# Patient Record
Sex: FEMALE | Race: WHITE | NOT HISPANIC OR LATINO | Employment: UNEMPLOYED | URBAN - METROPOLITAN AREA
[De-identification: names, ages, dates, MRNs, and addresses within clinical notes are randomized per-mention and may not be internally consistent; named-entity substitution may affect disease eponyms.]

---

## 2021-01-27 RX ORDER — PRAVASTATIN SODIUM 20 MG
20 TABLET ORAL DAILY
COMMUNITY

## 2021-01-27 RX ORDER — PIOGLITAZONEHYDROCHLORIDE 30 MG/1
30 TABLET ORAL DAILY
COMMUNITY

## 2021-01-27 RX ORDER — DOXYCYCLINE HYCLATE 50 MG/1
324 CAPSULE, GELATIN COATED ORAL
COMMUNITY
End: 2021-02-05 | Stop reason: HOSPADM

## 2021-01-27 RX ORDER — PAROXETINE HYDROCHLORIDE 40 MG/1
40 TABLET, FILM COATED ORAL EVERY MORNING
COMMUNITY

## 2021-01-27 RX ORDER — SODIUM BICARBONATE 325 MG/1
325 TABLET ORAL 3 TIMES DAILY
COMMUNITY

## 2021-01-27 RX ORDER — HYDRALAZINE HYDROCHLORIDE 25 MG/1
25 TABLET, FILM COATED ORAL 3 TIMES DAILY
COMMUNITY

## 2021-01-27 RX ORDER — VENLAFAXINE 37.5 MG/1
37.5 TABLET ORAL
COMMUNITY

## 2021-01-27 RX ORDER — ASPIRIN 81 MG/1
81 TABLET ORAL DAILY
COMMUNITY
End: 2021-02-05 | Stop reason: HOSPADM

## 2021-01-27 RX ORDER — GLIPIZIDE 5 MG/1
5 TABLET ORAL
COMMUNITY

## 2021-01-27 RX ORDER — HYDROCHLOROTHIAZIDE 25 MG/1
25 TABLET ORAL DAILY
COMMUNITY

## 2021-01-27 RX ORDER — VERAPAMIL HYDROCHLORIDE 240 MG/1
240 TABLET, FILM COATED, EXTENDED RELEASE ORAL NIGHTLY
COMMUNITY

## 2021-01-27 RX ORDER — CALCITRIOL 0.25 UG/1
0.25 CAPSULE, LIQUID FILLED ORAL DAILY
COMMUNITY

## 2021-01-28 ENCOUNTER — HOSPITAL ENCOUNTER (OUTPATIENT)
Dept: CARDIOLOGY | Facility: HOSPITAL | Age: 61
Discharge: HOME | End: 2021-01-28

## 2021-01-28 ENCOUNTER — HOSPITAL ENCOUNTER (OUTPATIENT)
Dept: GENERAL RADIOLOGY | Facility: HOSPITAL | Age: 61
Discharge: HOME | End: 2021-01-28

## 2021-01-28 ENCOUNTER — LAB (OUTPATIENT)
Dept: LAB | Facility: HOSPITAL | Age: 61
End: 2021-01-28
Attending: ORTHOPAEDIC SURGERY

## 2021-01-28 LAB
ABO GROUP BLD: NORMAL
ANION GAP SERPL CALCULATED.3IONS-SCNC: 11 MMOL/L (ref 5–15)
APTT PPP: 26.5 SECONDS (ref 24–31)
BILIRUB UR QL STRIP: NEGATIVE
BLD GP AB SCN SERPL QL: NEGATIVE
BUN SERPL-MCNC: 34 MG/DL (ref 8–23)
BUN/CREAT SERPL: 20.5 (ref 7–25)
CALCIUM SPEC-SCNC: 8.9 MG/DL (ref 8.6–10.5)
CHLORIDE SERPL-SCNC: 99 MMOL/L (ref 98–107)
CLARITY UR: CLEAR
CO2 SERPL-SCNC: 28 MMOL/L (ref 22–29)
COLOR UR: YELLOW
CREAT SERPL-MCNC: 1.66 MG/DL (ref 0.57–1)
DEPRECATED RDW RBC AUTO: 41.4 FL (ref 37–54)
ERYTHROCYTE [DISTWIDTH] IN BLOOD BY AUTOMATED COUNT: 14.6 % (ref 12.3–15.4)
GFR SERPL CREATININE-BSD FRML MDRD: 32 ML/MIN/1.73
GLUCOSE SERPL-MCNC: 56 MG/DL (ref 65–99)
GLUCOSE UR STRIP-MCNC: NEGATIVE MG/DL
HCT VFR BLD AUTO: 32.5 % (ref 34–46.6)
HGB BLD-MCNC: 10.5 G/DL (ref 12–15.9)
HGB UR QL STRIP.AUTO: NEGATIVE
INR PPP: 1.09 (ref 0.93–1.1)
KETONES UR QL STRIP: NEGATIVE
LEUKOCYTE ESTERASE UR QL STRIP.AUTO: NEGATIVE
MCH RBC QN AUTO: 25.4 PG (ref 26.6–33)
MCHC RBC AUTO-ENTMCNC: 32.3 G/DL (ref 31.5–35.7)
MCV RBC AUTO: 78.7 FL (ref 79–97)
MRSA DNA SPEC QL NAA+PROBE: NORMAL
NITRITE UR QL STRIP: NEGATIVE
PH UR STRIP.AUTO: 7 [PH] (ref 5–8)
PLATELET # BLD AUTO: 342 10*3/MM3 (ref 140–450)
PMV BLD AUTO: 10.5 FL (ref 6–12)
POTASSIUM SERPL-SCNC: 3.8 MMOL/L (ref 3.5–5.2)
PROT UR QL STRIP: ABNORMAL
PROTHROMBIN TIME: 11.9 SECONDS (ref 9.6–11.7)
RBC # BLD AUTO: 4.13 10*6/MM3 (ref 3.77–5.28)
RH BLD: NEGATIVE
SODIUM SERPL-SCNC: 138 MMOL/L (ref 136–145)
SP GR UR STRIP: 1.02 (ref 1–1.03)
T&S EXPIRATION DATE: NORMAL
UROBILINOGEN UR QL STRIP: ABNORMAL
WBC # BLD AUTO: 7.49 10*3/MM3 (ref 3.4–10.8)

## 2021-01-28 PROCEDURE — 71046 X-RAY EXAM CHEST 2 VIEWS: CPT

## 2021-01-28 PROCEDURE — 93005 ELECTROCARDIOGRAM TRACING: CPT | Performed by: ORTHOPAEDIC SURGERY

## 2021-01-28 PROCEDURE — 93010 ELECTROCARDIOGRAM REPORT: CPT | Performed by: INTERNAL MEDICINE

## 2021-01-28 PROCEDURE — 85027 COMPLETE CBC AUTOMATED: CPT

## 2021-01-28 PROCEDURE — 81003 URINALYSIS AUTO W/O SCOPE: CPT

## 2021-01-28 PROCEDURE — 86850 RBC ANTIBODY SCREEN: CPT

## 2021-01-28 PROCEDURE — 80048 BASIC METABOLIC PNL TOTAL CA: CPT

## 2021-01-28 PROCEDURE — 86901 BLOOD TYPING SEROLOGIC RH(D): CPT

## 2021-01-28 PROCEDURE — 86900 BLOOD TYPING SEROLOGIC ABO: CPT

## 2021-01-28 PROCEDURE — 87641 MR-STAPH DNA AMP PROBE: CPT

## 2021-01-28 PROCEDURE — 85730 THROMBOPLASTIN TIME PARTIAL: CPT

## 2021-01-28 PROCEDURE — 85610 PROTHROMBIN TIME: CPT

## 2021-01-29 LAB — QT INTERVAL: 461 MS

## 2021-02-01 ASSESSMENT — HOOS JR
HOOS JR SCORE: 11
HOOS JR SCORE: 55.985

## 2021-02-02 ENCOUNTER — LAB (OUTPATIENT)
Dept: LAB | Facility: HOSPITAL | Age: 61
End: 2021-02-02

## 2021-02-02 LAB — SARS-COV-2 ORF1AB RESP QL NAA+PROBE: NOT DETECTED

## 2021-02-02 PROCEDURE — U0004 COV-19 TEST NON-CDC HGH THRU: HCPCS

## 2021-02-02 PROCEDURE — C9803 HOPD COVID-19 SPEC COLLECT: HCPCS

## 2021-02-03 ENCOUNTER — ANESTHESIA EVENT (OUTPATIENT)
Dept: PERIOP | Facility: HOSPITAL | Age: 61
End: 2021-02-03

## 2021-02-04 ENCOUNTER — HOSPITAL ENCOUNTER (INPATIENT)
Facility: HOSPITAL | Age: 61
LOS: 1 days | Discharge: HOME | End: 2021-02-05
Attending: ORTHOPAEDIC SURGERY | Admitting: ORTHOPAEDIC SURGERY

## 2021-02-04 ENCOUNTER — ANESTHESIA (OUTPATIENT)
Dept: PERIOP | Facility: HOSPITAL | Age: 61
End: 2021-02-04

## 2021-02-04 ENCOUNTER — APPOINTMENT (OUTPATIENT)
Dept: GENERAL RADIOLOGY | Facility: HOSPITAL | Age: 61
End: 2021-02-04

## 2021-02-04 DIAGNOSIS — M16.11 ARTHRITIS OF RIGHT HIP: Primary | ICD-10-CM

## 2021-02-04 PROBLEM — N18.4 CHRONIC KIDNEY DISEASE, STAGE 4 (SEVERE) (HCC): Chronic | Status: ACTIVE | Noted: 2018-11-07

## 2021-02-04 PROBLEM — N18.4 CHRONIC KIDNEY DISEASE, STAGE 4 (SEVERE) (HCC): Status: ACTIVE | Noted: 2018-11-07

## 2021-02-04 PROBLEM — E78.2 MIXED HYPERLIPIDEMIA: Chronic | Status: ACTIVE | Noted: 2020-10-16

## 2021-02-04 PROBLEM — E11.9 TYPE 2 DIABETES MELLITUS WITHOUT COMPLICATION (HCC): Chronic | Status: ACTIVE | Noted: 2020-09-16

## 2021-02-04 PROBLEM — E66.9 OBESITY: Chronic | Status: ACTIVE | Noted: 2018-11-08

## 2021-02-04 PROBLEM — E11.42 DIABETIC PERIPHERAL NEUROPATHY: Chronic | Status: ACTIVE | Noted: 2020-10-16

## 2021-02-04 PROBLEM — M19.90 OSTEOARTHRITIS: Status: ACTIVE | Noted: 2020-10-16

## 2021-02-04 PROBLEM — E11.42 DIABETIC PERIPHERAL NEUROPATHY (HCC): Status: ACTIVE | Noted: 2020-10-16

## 2021-02-04 PROBLEM — E55.9 VITAMIN D DEFICIENCY: Status: ACTIVE | Noted: 2020-10-16

## 2021-02-04 PROBLEM — I10 ESSENTIAL HYPERTENSION: Status: ACTIVE | Noted: 2018-05-10

## 2021-02-04 PROBLEM — E78.2 MIXED HYPERLIPIDEMIA: Status: ACTIVE | Noted: 2020-10-16

## 2021-02-04 PROBLEM — E66.9 OBESITY: Status: ACTIVE | Noted: 2018-11-08

## 2021-02-04 PROBLEM — I10 ESSENTIAL HYPERTENSION: Chronic | Status: ACTIVE | Noted: 2018-05-10

## 2021-02-04 PROBLEM — E11.9 TYPE 2 DIABETES MELLITUS WITHOUT COMPLICATION (HCC): Status: ACTIVE | Noted: 2020-09-16

## 2021-02-04 LAB
GLUCOSE BLDC GLUCOMTR-MCNC: 108 MG/DL (ref 70–105)
GLUCOSE BLDC GLUCOMTR-MCNC: 166 MG/DL (ref 70–105)
GLUCOSE BLDC GLUCOMTR-MCNC: 233 MG/DL (ref 70–105)
GLUCOSE BLDC GLUCOMTR-MCNC: 236 MG/DL (ref 70–105)
GLUCOSE BLDC GLUCOMTR-MCNC: 254 MG/DL (ref 70–105)

## 2021-02-04 PROCEDURE — C1776 JOINT DEVICE (IMPLANTABLE): HCPCS | Performed by: ORTHOPAEDIC SURGERY

## 2021-02-04 PROCEDURE — 97110 THERAPEUTIC EXERCISES: CPT

## 2021-02-04 PROCEDURE — 97162 PT EVAL MOD COMPLEX 30 MIN: CPT

## 2021-02-04 PROCEDURE — 25010000002 EPINEPHRINE PER 0.1 MG: Performed by: ORTHOPAEDIC SURGERY

## 2021-02-04 PROCEDURE — 82962 GLUCOSE BLOOD TEST: CPT

## 2021-02-04 PROCEDURE — 25010000002 DEXAMETHASONE PER 1 MG: Performed by: ANESTHESIOLOGY

## 2021-02-04 PROCEDURE — 25010000002 PROPOFOL 10 MG/ML EMULSION: Performed by: ANESTHESIOLOGY

## 2021-02-04 PROCEDURE — 73501 X-RAY EXAM HIP UNI 1 VIEW: CPT

## 2021-02-04 PROCEDURE — 0SR90JZ REPLACEMENT OF RIGHT HIP JOINT WITH SYNTHETIC SUBSTITUTE, OPEN APPROACH: ICD-10-PCS | Performed by: ORTHOPAEDIC SURGERY

## 2021-02-04 PROCEDURE — 25010000002 CEFAZOLIN PER 500 MG: Performed by: ORTHOPAEDIC SURGERY

## 2021-02-04 PROCEDURE — 94799 UNLISTED PULMONARY SVC/PX: CPT

## 2021-02-04 PROCEDURE — 63710000001 INSULIN LISPRO (HUMAN) PER 5 UNITS: Performed by: NURSE PRACTITIONER

## 2021-02-04 PROCEDURE — 73502 X-RAY EXAM HIP UNI 2-3 VIEWS: CPT

## 2021-02-04 PROCEDURE — 25010000002 ONDANSETRON PER 1 MG: Performed by: NURSE ANESTHETIST, CERTIFIED REGISTERED

## 2021-02-04 PROCEDURE — 25010000002 ROPIVACAINE PER 1 MG: Performed by: ORTHOPAEDIC SURGERY

## 2021-02-04 PROCEDURE — 99222 1ST HOSP IP/OBS MODERATE 55: CPT | Performed by: NURSE PRACTITIONER

## 2021-02-04 PROCEDURE — 25010000002 FENTANYL CITRATE (PF) 100 MCG/2ML SOLUTION: Performed by: ANESTHESIOLOGY

## 2021-02-04 PROCEDURE — 25010000002 LORAZEPAM PER 2 MG: Performed by: ANESTHESIOLOGY

## 2021-02-04 PROCEDURE — 63710000001 INSULIN REGULAR HUMAN PER 5 UNITS: Performed by: ANESTHESIOLOGY

## 2021-02-04 PROCEDURE — 97165 OT EVAL LOW COMPLEX 30 MIN: CPT

## 2021-02-04 PROCEDURE — 27130 TOTAL HIP ARTHROPLASTY: CPT | Performed by: NURSE PRACTITIONER

## 2021-02-04 PROCEDURE — 76942 ECHO GUIDE FOR BIOPSY: CPT | Performed by: ORTHOPAEDIC SURGERY

## 2021-02-04 PROCEDURE — 25010000002 ROPIVACAINE PER 1 MG: Performed by: ANESTHESIOLOGY

## 2021-02-04 DEVICE — SCRW HEX LP TRIDENT2 6.5X35MM: Type: IMPLANTABLE DEVICE | Site: ACETABULUM | Status: FUNCTIONAL

## 2021-02-04 DEVICE — SCRW HEX LP TRIDENT2 6.5X25MM: Type: IMPLANTABLE DEVICE | Site: HIP | Status: FUNCTIONAL

## 2021-02-04 DEVICE — SHLL TRIDENT2 TRITANIUM C/HL 50MM: Type: IMPLANTABLE DEVICE | Site: ACETABULUM | Status: FUNCTIONAL

## 2021-02-04 DEVICE — CAP TOTL HIP MOBL BEAR 5 PC: Type: IMPLANTABLE DEVICE | Site: ACETABULUM | Status: FUNCTIONAL

## 2021-02-04 DEVICE — IMPLANTABLE DEVICE: Type: IMPLANTABLE DEVICE | Site: HIP | Status: FUNCTIONAL

## 2021-02-04 DEVICE — SCRW HEX LP TRIDENT2 6.5X20MM: Type: IMPLANTABLE DEVICE | Site: HIP | Status: FUNCTIONAL

## 2021-02-04 DEVICE — INSRT TRIDENT X3 0D 36MM SZD: Type: IMPLANTABLE DEVICE | Site: HIP | Status: FUNCTIONAL

## 2021-02-04 RX ORDER — SODIUM CHLORIDE, SODIUM LACTATE, POTASSIUM CHLORIDE, CALCIUM CHLORIDE 600; 310; 30; 20 MG/100ML; MG/100ML; MG/100ML; MG/100ML
1000 INJECTION, SOLUTION INTRAVENOUS CONTINUOUS
Status: DISCONTINUED | OUTPATIENT
Start: 2021-02-04 | End: 2021-02-05 | Stop reason: HOSPADM

## 2021-02-04 RX ORDER — INSULIN LISPRO 100 [IU]/ML
0-9 INJECTION, SOLUTION INTRAVENOUS; SUBCUTANEOUS AS NEEDED
Status: DISCONTINUED | OUTPATIENT
Start: 2021-02-04 | End: 2021-02-05 | Stop reason: HOSPADM

## 2021-02-04 RX ORDER — LORAZEPAM 2 MG/ML
1 INJECTION INTRAMUSCULAR ONCE
Status: COMPLETED | OUTPATIENT
Start: 2021-02-04 | End: 2021-02-04

## 2021-02-04 RX ORDER — HYDROCHLOROTHIAZIDE 25 MG/1
25 TABLET ORAL DAILY
Status: DISCONTINUED | OUTPATIENT
Start: 2021-02-05 | End: 2021-02-05 | Stop reason: HOSPADM

## 2021-02-04 RX ORDER — PIOGLITAZONEHYDROCHLORIDE 30 MG/1
30 TABLET ORAL DAILY
Status: DISCONTINUED | OUTPATIENT
Start: 2021-02-04 | End: 2021-02-04

## 2021-02-04 RX ORDER — PROPOFOL 10 MG/ML
VIAL (ML) INTRAVENOUS AS NEEDED
Status: DISCONTINUED | OUTPATIENT
Start: 2021-02-04 | End: 2021-02-04 | Stop reason: SURG

## 2021-02-04 RX ORDER — INSULIN LISPRO 100 [IU]/ML
0-9 INJECTION, SOLUTION INTRAVENOUS; SUBCUTANEOUS
Status: DISCONTINUED | OUTPATIENT
Start: 2021-02-04 | End: 2021-02-05 | Stop reason: HOSPADM

## 2021-02-04 RX ORDER — MORPHINE SULFATE 4 MG/ML
4 INJECTION, SOLUTION INTRAMUSCULAR; INTRAVENOUS
Status: DISCONTINUED | OUTPATIENT
Start: 2021-02-04 | End: 2021-02-05 | Stop reason: HOSPADM

## 2021-02-04 RX ORDER — ONDANSETRON 2 MG/ML
INJECTION INTRAMUSCULAR; INTRAVENOUS AS NEEDED
Status: DISCONTINUED | OUTPATIENT
Start: 2021-02-04 | End: 2021-02-04 | Stop reason: SURG

## 2021-02-04 RX ORDER — VENLAFAXINE 75 MG/1
37.5 TABLET ORAL DAILY
Status: DISCONTINUED | OUTPATIENT
Start: 2021-02-04 | End: 2021-02-05 | Stop reason: HOSPADM

## 2021-02-04 RX ORDER — PAROXETINE HYDROCHLORIDE 40 MG/1
40 TABLET, FILM COATED ORAL DAILY
Status: DISCONTINUED | OUTPATIENT
Start: 2021-02-04 | End: 2021-02-05 | Stop reason: HOSPADM

## 2021-02-04 RX ORDER — ATORVASTATIN CALCIUM 10 MG/1
10 TABLET, FILM COATED ORAL DAILY
Status: DISCONTINUED | OUTPATIENT
Start: 2021-02-04 | End: 2021-02-05 | Stop reason: HOSPADM

## 2021-02-04 RX ORDER — MORPHINE SULFATE 4 MG/ML
3 INJECTION, SOLUTION INTRAMUSCULAR; INTRAVENOUS
Status: DISCONTINUED | OUTPATIENT
Start: 2021-02-04 | End: 2021-02-04 | Stop reason: HOSPADM

## 2021-02-04 RX ORDER — POTASSIUM CHLORIDE 20 MEQ/1
40 TABLET, EXTENDED RELEASE ORAL AS NEEDED
Status: DISCONTINUED | OUTPATIENT
Start: 2021-02-04 | End: 2021-02-05 | Stop reason: HOSPADM

## 2021-02-04 RX ORDER — ROPIVACAINE HYDROCHLORIDE 5 MG/ML
INJECTION, SOLUTION EPIDURAL; INFILTRATION; PERINEURAL
Status: COMPLETED | OUTPATIENT
Start: 2021-02-04 | End: 2021-02-04

## 2021-02-04 RX ORDER — CHOLECALCIFEROL (VITAMIN D3) 125 MCG
5 CAPSULE ORAL NIGHTLY PRN
Status: DISCONTINUED | OUTPATIENT
Start: 2021-02-04 | End: 2021-02-05 | Stop reason: HOSPADM

## 2021-02-04 RX ORDER — ACETAMINOPHEN 500 MG
1000 TABLET ORAL ONCE
Status: COMPLETED | OUTPATIENT
Start: 2021-02-04 | End: 2021-02-04

## 2021-02-04 RX ORDER — ROCURONIUM BROMIDE 10 MG/ML
INJECTION, SOLUTION INTRAVENOUS AS NEEDED
Status: DISCONTINUED | OUTPATIENT
Start: 2021-02-04 | End: 2021-02-04 | Stop reason: SURG

## 2021-02-04 RX ORDER — TRANEXAMIC ACID 100 MG/ML
1000 INJECTION, SOLUTION INTRAVENOUS ONCE
Status: COMPLETED | OUTPATIENT
Start: 2021-02-04 | End: 2021-02-04

## 2021-02-04 RX ORDER — MAGNESIUM SULFATE 1 G/100ML
1 INJECTION INTRAVENOUS AS NEEDED
Status: DISCONTINUED | OUTPATIENT
Start: 2021-02-04 | End: 2021-02-05 | Stop reason: HOSPADM

## 2021-02-04 RX ORDER — NALOXONE HCL 0.4 MG/ML
0.4 VIAL (ML) INJECTION
Status: DISCONTINUED | OUTPATIENT
Start: 2021-02-04 | End: 2021-02-05 | Stop reason: HOSPADM

## 2021-02-04 RX ORDER — ACETAMINOPHEN 325 MG/1
325 TABLET ORAL EVERY 4 HOURS PRN
Status: DISCONTINUED | OUTPATIENT
Start: 2021-02-04 | End: 2021-02-05 | Stop reason: HOSPADM

## 2021-02-04 RX ORDER — OXYCODONE HYDROCHLORIDE 5 MG/1
5 TABLET ORAL EVERY 4 HOURS PRN
Status: DISCONTINUED | OUTPATIENT
Start: 2021-02-04 | End: 2021-02-05 | Stop reason: HOSPADM

## 2021-02-04 RX ORDER — HYDRALAZINE HYDROCHLORIDE 20 MG/ML
10 INJECTION INTRAMUSCULAR; INTRAVENOUS EVERY 6 HOURS PRN
Status: DISCONTINUED | OUTPATIENT
Start: 2021-02-04 | End: 2021-02-05 | Stop reason: HOSPADM

## 2021-02-04 RX ORDER — LIDOCAINE HYDROCHLORIDE 10 MG/ML
0.5 INJECTION, SOLUTION INFILTRATION; PERINEURAL ONCE AS NEEDED
Status: DISCONTINUED | OUTPATIENT
Start: 2021-02-04 | End: 2021-02-04 | Stop reason: HOSPADM

## 2021-02-04 RX ORDER — DEXTROSE MONOHYDRATE 25 G/50ML
25 INJECTION, SOLUTION INTRAVENOUS
Status: DISCONTINUED | OUTPATIENT
Start: 2021-02-04 | End: 2021-02-05 | Stop reason: HOSPADM

## 2021-02-04 RX ORDER — SODIUM CHLORIDE 0.9 % (FLUSH) 0.9 %
3 SYRINGE (ML) INJECTION EVERY 12 HOURS SCHEDULED
Status: DISCONTINUED | OUTPATIENT
Start: 2021-02-04 | End: 2021-02-05 | Stop reason: HOSPADM

## 2021-02-04 RX ORDER — ASPIRIN 325 MG
325 TABLET, DELAYED RELEASE (ENTERIC COATED) ORAL EVERY 12 HOURS SCHEDULED
Status: DISCONTINUED | OUTPATIENT
Start: 2021-02-04 | End: 2021-02-05 | Stop reason: HOSPADM

## 2021-02-04 RX ORDER — DEXAMETHASONE SODIUM PHOSPHATE 4 MG/ML
INJECTION, SOLUTION INTRA-ARTICULAR; INTRALESIONAL; INTRAMUSCULAR; INTRAVENOUS; SOFT TISSUE
Status: COMPLETED | OUTPATIENT
Start: 2021-02-04 | End: 2021-02-04

## 2021-02-04 RX ORDER — PROMETHAZINE HYDROCHLORIDE 12.5 MG/1
12.5 TABLET ORAL EVERY 6 HOURS PRN
Status: DISCONTINUED | OUTPATIENT
Start: 2021-02-04 | End: 2021-02-05 | Stop reason: HOSPADM

## 2021-02-04 RX ORDER — NEOSTIGMINE METHYLSULFATE 5 MG/5 ML
SYRINGE (ML) INTRAVENOUS AS NEEDED
Status: DISCONTINUED | OUTPATIENT
Start: 2021-02-04 | End: 2021-02-04 | Stop reason: SURG

## 2021-02-04 RX ORDER — GLYCOPYRROLATE 1 MG/5 ML
SYRINGE (ML) INTRAVENOUS AS NEEDED
Status: DISCONTINUED | OUTPATIENT
Start: 2021-02-04 | End: 2021-02-04 | Stop reason: SURG

## 2021-02-04 RX ORDER — SODIUM BICARBONATE 650 MG/1
325 TABLET ORAL 3 TIMES DAILY
Status: DISCONTINUED | OUTPATIENT
Start: 2021-02-04 | End: 2021-02-05 | Stop reason: HOSPADM

## 2021-02-04 RX ORDER — GLIPIZIDE 5 MG/1
5 TABLET ORAL
Status: DISCONTINUED | OUTPATIENT
Start: 2021-02-04 | End: 2021-02-04

## 2021-02-04 RX ORDER — SODIUM CHLORIDE 9 MG/ML
125 INJECTION, SOLUTION INTRAVENOUS CONTINUOUS
Status: DISCONTINUED | OUTPATIENT
Start: 2021-02-04 | End: 2021-02-05 | Stop reason: HOSPADM

## 2021-02-04 RX ORDER — DEXAMETHASONE SODIUM PHOSPHATE 4 MG/ML
INJECTION, SOLUTION INTRA-ARTICULAR; INTRALESIONAL; INTRAMUSCULAR; INTRAVENOUS; SOFT TISSUE AS NEEDED
Status: DISCONTINUED | OUTPATIENT
Start: 2021-02-04 | End: 2021-02-04 | Stop reason: SURG

## 2021-02-04 RX ORDER — HYDRALAZINE HYDROCHLORIDE 25 MG/1
25 TABLET, FILM COATED ORAL DAILY
Status: DISCONTINUED | OUTPATIENT
Start: 2021-02-04 | End: 2021-02-05 | Stop reason: HOSPADM

## 2021-02-04 RX ORDER — OXYCODONE HYDROCHLORIDE 5 MG/1
10 TABLET ORAL EVERY 4 HOURS PRN
Status: DISCONTINUED | OUTPATIENT
Start: 2021-02-04 | End: 2021-02-05 | Stop reason: HOSPADM

## 2021-02-04 RX ORDER — SODIUM CHLORIDE 0.9 % (FLUSH) 0.9 %
3-10 SYRINGE (ML) INJECTION AS NEEDED
Status: DISCONTINUED | OUTPATIENT
Start: 2021-02-04 | End: 2021-02-05 | Stop reason: HOSPADM

## 2021-02-04 RX ORDER — VERAPAMIL HYDROCHLORIDE 240 MG/1
240 TABLET, FILM COATED, EXTENDED RELEASE ORAL NIGHTLY
Status: DISCONTINUED | OUTPATIENT
Start: 2021-02-04 | End: 2021-02-05 | Stop reason: HOSPADM

## 2021-02-04 RX ORDER — HYDROMORPHONE HCL 110MG/55ML
0.5 PATIENT CONTROLLED ANALGESIA SYRINGE INTRAVENOUS
Status: DISCONTINUED | OUTPATIENT
Start: 2021-02-04 | End: 2021-02-04 | Stop reason: HOSPADM

## 2021-02-04 RX ORDER — OXYCODONE HYDROCHLORIDE 5 MG/1
5 TABLET ORAL ONCE AS NEEDED
Status: DISCONTINUED | OUTPATIENT
Start: 2021-02-04 | End: 2021-02-04 | Stop reason: HOSPADM

## 2021-02-04 RX ORDER — NICOTINE POLACRILEX 4 MG
15 LOZENGE BUCCAL
Status: DISCONTINUED | OUTPATIENT
Start: 2021-02-04 | End: 2021-02-05 | Stop reason: HOSPADM

## 2021-02-04 RX ORDER — MAGNESIUM SULFATE HEPTAHYDRATE 40 MG/ML
2 INJECTION, SOLUTION INTRAVENOUS AS NEEDED
Status: DISCONTINUED | OUTPATIENT
Start: 2021-02-04 | End: 2021-02-05 | Stop reason: HOSPADM

## 2021-02-04 RX ORDER — SODIUM CHLORIDE 0.9 % (FLUSH) 0.9 %
10 SYRINGE (ML) INJECTION AS NEEDED
Status: DISCONTINUED | OUTPATIENT
Start: 2021-02-04 | End: 2021-02-04 | Stop reason: HOSPADM

## 2021-02-04 RX ORDER — FENTANYL CITRATE 50 UG/ML
INJECTION, SOLUTION INTRAMUSCULAR; INTRAVENOUS AS NEEDED
Status: DISCONTINUED | OUTPATIENT
Start: 2021-02-04 | End: 2021-02-04 | Stop reason: SURG

## 2021-02-04 RX ORDER — ONDANSETRON 2 MG/ML
4 INJECTION INTRAMUSCULAR; INTRAVENOUS EVERY 6 HOURS PRN
Status: DISCONTINUED | OUTPATIENT
Start: 2021-02-04 | End: 2021-02-05 | Stop reason: HOSPADM

## 2021-02-04 RX ORDER — POLYETHYLENE GLYCOL 3350 17 G/17G
17 POWDER, FOR SOLUTION ORAL DAILY
Status: DISCONTINUED | OUTPATIENT
Start: 2021-02-04 | End: 2021-02-05 | Stop reason: HOSPADM

## 2021-02-04 RX ORDER — CALCITRIOL 0.25 UG/1
0.25 CAPSULE, LIQUID FILLED ORAL DAILY
Status: DISCONTINUED | OUTPATIENT
Start: 2021-02-04 | End: 2021-02-05 | Stop reason: HOSPADM

## 2021-02-04 RX ORDER — ONDANSETRON 4 MG/1
4 TABLET, FILM COATED ORAL EVERY 6 HOURS PRN
Status: DISCONTINUED | OUTPATIENT
Start: 2021-02-04 | End: 2021-02-05 | Stop reason: HOSPADM

## 2021-02-04 RX ADMIN — PROPOFOL 100 MCG/KG/MIN: 10 INJECTION, EMULSION INTRAVENOUS at 09:29

## 2021-02-04 RX ADMIN — LORAZEPAM 1 MG: 2 INJECTION INTRAMUSCULAR; INTRAVENOUS at 08:34

## 2021-02-04 RX ADMIN — FENTANYL CITRATE 50 MCG: 50 INJECTION, SOLUTION INTRAMUSCULAR; INTRAVENOUS at 11:21

## 2021-02-04 RX ADMIN — ROPIVACAINE HYDROCHLORIDE 30 ML: 5 INJECTION, SOLUTION EPIDURAL; INFILTRATION; PERINEURAL at 09:12

## 2021-02-04 RX ADMIN — CEFAZOLIN SODIUM 2 G: 1 INJECTION, POWDER, FOR SOLUTION INTRAMUSCULAR; INTRAVENOUS at 09:33

## 2021-02-04 RX ADMIN — CALCITRIOL 0.25 MCG: 0.25 CAPSULE ORAL at 16:18

## 2021-02-04 RX ADMIN — SODIUM BICARBONATE 650 MG TABLET 325 MG: at 16:19

## 2021-02-04 RX ADMIN — Medication 5 MG: at 11:58

## 2021-02-04 RX ADMIN — FENTANYL CITRATE 50 MCG: 50 INJECTION, SOLUTION INTRAMUSCULAR; INTRAVENOUS at 09:24

## 2021-02-04 RX ADMIN — ONDANSETRON 4 MG: 2 INJECTION INTRAMUSCULAR; INTRAVENOUS at 09:55

## 2021-02-04 RX ADMIN — VENLAFAXINE 37.5 MG: 75 TABLET ORAL at 16:18

## 2021-02-04 RX ADMIN — INSULIN LISPRO 4 UNITS: 100 INJECTION, SOLUTION INTRAVENOUS; SUBCUTANEOUS at 18:17

## 2021-02-04 RX ADMIN — DEXAMETHASONE SODIUM PHOSPHATE 4 MG: 4 INJECTION, SOLUTION INTRAMUSCULAR; INTRAVENOUS at 09:40

## 2021-02-04 RX ADMIN — VERAPAMIL HYDROCHLORIDE 240 MG: 240 TABLET, FILM COATED, EXTENDED RELEASE ORAL at 20:48

## 2021-02-04 RX ADMIN — Medication 3 ML: at 20:49

## 2021-02-04 RX ADMIN — CEFAZOLIN SODIUM 2 G: 10 INJECTION, POWDER, FOR SOLUTION INTRAVENOUS at 18:17

## 2021-02-04 RX ADMIN — INSULIN HUMAN 5 UNITS: 100 INJECTION, SOLUTION PARENTERAL at 12:36

## 2021-02-04 RX ADMIN — PROPOFOL 150 MG: 10 INJECTION, EMULSION INTRAVENOUS at 09:27

## 2021-02-04 RX ADMIN — Medication 0.8 MG: at 11:58

## 2021-02-04 RX ADMIN — ROCURONIUM BROMIDE 10 MG: 10 INJECTION, SOLUTION INTRAVENOUS at 10:48

## 2021-02-04 RX ADMIN — ACETAMINOPHEN 1000 MG: 500 TABLET, FILM COATED ORAL at 08:25

## 2021-02-04 RX ADMIN — ROCURONIUM BROMIDE 10 MG: 10 INJECTION, SOLUTION INTRAVENOUS at 10:31

## 2021-02-04 RX ADMIN — SODIUM BICARBONATE 650 MG TABLET 325 MG: at 20:48

## 2021-02-04 RX ADMIN — FENTANYL CITRATE 50 MCG: 50 INJECTION, SOLUTION INTRAMUSCULAR; INTRAVENOUS at 10:57

## 2021-02-04 RX ADMIN — HYDRALAZINE HYDROCHLORIDE 25 MG: 25 TABLET, FILM COATED ORAL at 16:19

## 2021-02-04 RX ADMIN — SODIUM CHLORIDE 125 ML/HR: 9 INJECTION, SOLUTION INTRAVENOUS at 16:24

## 2021-02-04 RX ADMIN — ROCURONIUM BROMIDE 10 MG: 10 INJECTION, SOLUTION INTRAVENOUS at 10:21

## 2021-02-04 RX ADMIN — ROCURONIUM BROMIDE 40 MG: 10 INJECTION, SOLUTION INTRAVENOUS at 09:28

## 2021-02-04 RX ADMIN — DEXAMETHASONE SODIUM PHOSPHATE 4 MG: 4 INJECTION, SOLUTION INTRAMUSCULAR; INTRAVENOUS at 09:12

## 2021-02-04 RX ADMIN — ASPIRIN 325 MG: 325 TABLET, COATED ORAL at 20:48

## 2021-02-04 RX ADMIN — MAGNESIUM GLUCONATE 500 MG ORAL TABLET 400 MG: 500 TABLET ORAL at 16:19

## 2021-02-04 RX ADMIN — PAROXETINE HYDROCHLORIDE 40 MG: 40 TABLET, FILM COATED ORAL at 16:19

## 2021-02-04 RX ADMIN — ATORVASTATIN CALCIUM 10 MG: 10 TABLET, FILM COATED ORAL at 16:19

## 2021-02-04 RX ADMIN — TRANEXAMIC ACID 1000 MG: 100 INJECTION, SOLUTION INTRAVENOUS at 09:43

## 2021-02-04 RX ADMIN — ROCURONIUM BROMIDE 10 MG: 10 INJECTION, SOLUTION INTRAVENOUS at 11:16

## 2021-02-04 RX ADMIN — FENTANYL CITRATE 50 MCG: 50 INJECTION, SOLUTION INTRAMUSCULAR; INTRAVENOUS at 09:43

## 2021-02-04 RX ADMIN — SODIUM CHLORIDE, POTASSIUM CHLORIDE, SODIUM LACTATE AND CALCIUM CHLORIDE 1000 ML: 600; 310; 30; 20 INJECTION, SOLUTION INTRAVENOUS at 08:17

## 2021-02-04 NOTE — ANESTHESIA POSTPROCEDURE EVALUATION
Patient: Stefany Mancera    Procedure Summary     Date: 02/04/21 Room / Location: [facility] OR 12 / [facility] OR    Anesthesia Start: 0923 Anesthesia Stop: 1204    Procedure: TOTAL HIP ARTHROPLASTY (Right Hip) Diagnosis:       Osteoarthritis of right hip      (Osteoarthritis of right hip [M16.11])    Surgeon: Ángel Madrigal MD Provider: Keon Kaufman MD    Anesthesia Type: general ASA Status: 3          Anesthesia Type: general    Vitals  Vitals Value Taken Time   /55 02/04/21 1418   Temp 97.5 °F (36.4 °C) 02/04/21 1206   Pulse 74 02/04/21 1418   Resp 15 02/04/21 1406   SpO2 98 % 02/04/21 1418   Vitals shown include unvalidated device data.        Post Anesthesia Care and Evaluation    Patient location during evaluation: PACU  Patient participation: complete - patient participated  Level of consciousness: awake  Pain scale: See nurse's notes for pain score.  Pain management: adequate  Airway patency: patent  Anesthetic complications: No anesthetic complications  PONV Status: none  Cardiovascular status: acceptable  Respiratory status: acceptable  Hydration status: acceptable    Comments: Patient seen and examined postoperatively; vital signs stable; SpO2 greater than or equal to 90%; cardiopulmonary status stable; nausea/vomiting adequately controlled; pain adequately controlled; no apparent anesthesia complications; patient discharged from anesthesia care when discharge criteria were met

## 2021-02-04 NOTE — PLAN OF CARE
Goal Outcome Evaluation:  Plan of Care Reviewed With: patient  Progress: no change  Outcome Summary: Pt. is 61 y/o female admit POD # 0 R posterior LUIZA. Pt. states that she is ADL independent at baseline and utilzies rollator for ambulatory bathroom transfers. Pt. completes bed mobility w/ mod A supine to sit and sit to supine transfers. Pt. w/ increased difficulty this date and unable to complete standing activity secondary to nerve block w/ decreased sesnation/AROM RLE. Pt. will require education regarding AE for MOD I ADLs prior to d/c, anticipate d/c to home w/ family support/assist. Will follow up w/ pt. 1-3x per week at [facility].    PPE: gloves, mask, face shield

## 2021-02-04 NOTE — ANESTHESIA PREPROCEDURE EVALUATION
Anesthesia Evaluation     Patient summary reviewed and Nursing notes reviewed   NPO Solid Status: > 8 hours  NPO Liquid Status: > 8 hours           Airway   Mallampati: I  TM distance: >3 FB  Neck ROM: full  No difficulty expected  Dental - normal exam   (+) edentulous    Pulmonary - negative pulmonary ROS and normal exam   Cardiovascular - normal exam    (+) hypertension,       Neuro/Psych- negative ROS  GI/Hepatic/Renal/Endo    (+)   diabetes mellitus,     Musculoskeletal     Abdominal  - normal exam    Bowel sounds: normal.   Substance History - negative use     OB/GYN negative ob/gyn ROS         Other   arthritis,      ROS/Med Hx Other: ANEMIA                Anesthesia Plan    ASA 3     general   (TYLENOL 1GM GIVEN  ATIVAN 1MG FOR SEDATION  AGREEABLE TO FI BLOCK  QUESTIONS ADDRESSED)  intravenous induction     Anesthetic plan, all risks, benefits, and alternatives have been provided, discussed and informed consent has been obtained with: patient.    Plan discussed with CRNA.

## 2021-02-04 NOTE — OP NOTE
TOTAL HIP ARTHROPLASTY  Procedure Report    Patient Name:  Stefany Mancera  YOB: 1960    Date of Surgery:  2/4/2021         Pre-op Diagnosis: Right hip arthritis    Postop diagnosis: Same    Indication: 60-year-old white female with right hip arthritis.  Was never replaced for pain relief      Procedure/CPT® Codes:      Procedure(s):  TOTAL HIP ARTHROPLASTY, right    Staff:  Surgeon(s):  Ángel Madrigal MD    Assistant: Ilda Stubbs APRN   assisted with hemostasis retraction and wound closure and was essential to the case.    Anesthesia: General    Estimated Blood Loss: 500 mL    Implants:    Implant Name Type Inv. Item Serial No.  Lot No. LRB No. Used Action   SHLL TRIDENT2 TRITANIUM C/HL 50MM - [id number] Implant SHLL TRIDENT2 TRITANIUM C/HL 50MM  [employer] [id number] Right 1 Implanted   SCRW HEX LP TRIDENT2 6.5X35MM - [id number] Implant SCRW HEX LP TRIDENT2 6.5X35MM  [employer] 2LV Right 1 Implanted   SCRW HEX LP TRIDENT2 6.5X20MM - [id number] Implant SCRW HEX LP TRIDENT2 6.5X20MM  [employer] [id number] Right 1 Implanted   SCRW HEX LP TRIDENT2 6.5X25MM - [id number] Implant SCRW HEX LP TRIDENT2 6.5X25MM  [employer] [id number] Right 1 Implanted   STEM HIP SECURFIT MAX [id number] ANGL SZ11 [id number] - [id number] Implant STEM HIP SECURFIT MAX [id number] ANGL SZ11 [id number]  [employer] [id number] Right 1 Implanted   HD FEM LFIT C/TPR RYAN COCR 36MM MIN5 - [id number] Implant HD FEM LFIT C/TPR RYAN COCR 36MM MIN5  [employer] [id number] Right 1 Implanted   INSRT TRIDENT X3 0D 36MM SZD - [id number] Implant INSRT TRIDENT X3 0D 36MM SZD  [employer] [id number] Right 1 Implanted       Specimen:          0        Findings: Obese hip.  Advanced arthritis    Complications: None    Description of Procedure:   Patient was seen in holding room.  Consent obtained.  The operative extremity was initialed by me. patient received preop 2 g Kefzol.  Patient received 1000 mg tranexamic acid.  Patient taken the operating room  with anesthesia as listed above.  Patient had Humphries catheter.  Positioned in the lateral decubitus position for the appropriate hip.  Patient with pubic and sacral braces in the lateral position.  The appropriate lower extremity was prepped and draped sterilely.  Ioban drape was used.  The posterior approach the hip was used.   Incision was carried proximally positively.  The fascia was split in line with the incision with Justice scissors.  The Charnley retractor was placed superfocial to the sciatic nerve.  The short external rotators from the quadratus femoris to the piriformis were released.  The capsule was opened superiorly.  The hip was dislocated.  The femoral neck cut was marked 1.5 cm proximal to lesser trochanter with hip internally rotated 90 degrees.  The femoral neck cut was made with a power saw and the head was removed.  It was measured at 45 mm.  The wing retractors were placed posteriorly and a cobra retractor anteriorly and  Long.inferiorly.  The capsule was incised.  Reaming started with hemispherical reamers at size 45 going up to size 50 in 1 mm increments at 45 degrees of abduction and 20 degrees of flexion.  Trial was tried at size 50 with a tight fit.  Hip was irrigated.  Cup was placed in the previously mentioned degree of anteversion.  The true cup was then impacted in the previous degree of  anteversion with the cluster holes in the posterior superior aspect of the dome.  2 screws were placed, one in the posterior superior aspectand  one in the posterior aspect of the dome using 6.5 mm screws.  The true metallic MDM liner was locked into the cup and verified to be secure then protected with a sponge.  The proximal  femur was prepared using the box chisel and canal finder, then reaming was done by hand with the tapered reamer starting at size 4 going up one size time until size 11.  Broaching was then done starting 2 sizes below this going up to the final size 11.  The MDM did not have a  minus option and because the neck length was then 40 size 11 we had to take out the MDM liner and switch to a X3 polyethylene liner with 36 mm inner diameter which was locked in and verified to be secure  .  The trial reduction was done with a size 36 head with his 5 neck length.  It was stable and had good leg lengths.  The punch was then removed.  The canal was irrigated.  The true stem was then impacted seating well  The trial was then tested with good stability.  The true head was impacted on the  trunion. 30 cc of half percent Marcaine was mixed with 30 cc of saline and half cc of 1 1000 epinephrine.  40 cc of this was injected epidurally around the capsule and 20 cc of the lateral soft tissues.  Hip was thoroughly irrigated.  The capsule and piriformis  were repaired back with #2 Ethibond sutures.  The fascia was repaired with #2 Vicryl interrupted and the subcu tissue with 2-0 Vicryl interrupted and skin staples.  Patient had sterile dressing applied.  An abduction pillow placed.  Humphries  catheter removed.  Patient was a stable in the operating room    Ángel Madrigal MD  2/4/2021 12:10 EST

## 2021-02-04 NOTE — THERAPY EVALUATION
Patient Name: Stefany Mancera  : 1960    MRN: [id number]                              Today's Date: 2021       Admit Date: 2021    Visit Dx: No diagnosis found.  Patient Active Problem List   Diagnosis   • Arthritis of right hip   • Diabetic peripheral neuropathy (CMS/HCC)   • Essential hypertension   • Mixed anxiety and depressive disorder   • Mixed hyperlipidemia   • Obesity   • Osteoarthritis   • Chronic kidney disease, stage 4 (severe) (CMS/[facility])   • Type 2 diabetes mellitus without complication (CMS/[facility])   • Vitamin D deficiency     Past Medical History:   Diagnosis Date   • Arthritis    • Chronic kidney disease, stage 4 (severe) (CMS/HCC) 2018   • Depression    • Diabetes mellitus (CMS/[facility])    • Hypertension    • Mixed anxiety and depressive disorder 2016     Past Surgical History:   Procedure Laterality Date   • CHOLECYSTECTOMY     • HYSTERECTOMY       General Information     Row Name 21 1618          Physical Therapy Time and Intention    Document Type  evaluation  -EL     Mode of Treatment  co-treatment;physical therapy;occupational therapy  -EL     Row Name 21 1618          General Information    Patient Profile Reviewed  yes  -EL     Prior Level of Function  independent:;all household mobility;driving;ADL's  -EL     Row Name 21 1618          Living Environment    Lives With  spouse Daughter staying with her following d/c  -EL     Row Name 21 1618          Home Main Entrance    Number of Stairs, Main Entrance  none  -EL     Row Name 21 1618          Stairs Within Home, Primary    Number of Stairs, Within Home, Primary  none  -EL     Row Name 21 1618          Cognition    Orientation Status (Cognition)  oriented x 4  -EL     Row Name 21 1618          Safety Issues, Functional Mobility    Impairments Affecting Function (Mobility)  balance;sensation/sensory awareness;endurance/activity tolerance;strength;pain;muscle tone abnormal  -EL        User Key  (r) = Recorded By, (t) = Taken By, (c) = Cosigned By    Initials Name Provider Type    Eric Bowman, MALINDA Physical Therapist        Mobility     [facility] Name 02/04/21 1619          Bed Mobility    Bed Mobility  supine-sit  -EL     Supine-Sit Cross (Bed Mobility)  moderate assist (50% patient effort)  -EL     Assistive Device (Bed Mobility)  bed rails;draw sheet  -[facility] Name 02/04/21 1619          Transfers    Comment (Transfers)  Attempted to come to standing, MAX A x2 unable to come to standing this date due to nerve block  -[facility] Name 02/04/21 1619          Bed-Chair Transfer    Bed-Chair Cross (Transfers)  not tested  -EL     Row Name 02/04/21 1619          Sit-Stand Transfer    Sit-Stand Cross (Transfers)  unable to assess  -EL     Row Name 02/04/21 1619          Mobility    Extremity Weight-bearing Status  right lower extremity  -EL     Right Lower Extremity (Weight-bearing Status)  weight-bearing as tolerated (WBAT)  -       User Key  (r) = Recorded By, (t) = Taken By, (c) = Cosigned By    Initials Name Provider Type    Eric Bowman PT Physical Therapist        Obj/Interventions     [facility] Name 02/04/21 1621          Range of Motion Comprehensive    General Range of Motion  lower extremity range of motion deficits identified  -EL     Comment, General Range of Motion  RLE limited due to nerve block and hip precautions  -[facility] Name 02/04/21 1621          Strength Comprehensive (MMT)    General Manual Muscle Testing (MMT) Assessment  lower extremity strength deficits identified  -EL     Comment, General Manual Muscle Testing (MMT) Assessment  RLE 2/5 due to nerve block  -[facility] Name 02/04/21 1621          Balance    Balance Assessment  sitting static balance  -EL     Static Sitting Balance  WFL  -EL     Row Name 02/04/21 1621          Sensory Assessment (Somatosensory)    Sensory Assessment (Somatosensory)  other (see comments) numbness in RLE due to nerve block   -EL       User Key  (r) = Recorded By, (t) = Taken By, (c) = Cosigned By    Initials Name Provider Type    Eric Bowman PT Physical Therapist        Goals/Plan     Row Name 02/04/21 1626          Bed Mobility Goal 1 (PT)    Activity/Assistive Device (Bed Mobility Goal 1, PT)  bed mobility activities, all  -EL     [place] Level/Cues Needed (Bed Mobility Goal 1, PT)  supervision required  -EL     Time Frame (Bed Mobility Goal 1, PT)  long term goal (LTG);2 weeks  -EL     Row Name 02/04/21 1626          Transfer Goal 1 (PT)    Activity/Assistive Device (Transfer Goal 1, PT)  transfers, all  -EL     [place] Level/Cues Needed (Transfer Goal 1, PT)  supervision required  -EL     Time Frame (Transfer Goal 1, PT)  long term goal (LTG);2 weeks  -EL     Row Name 02/04/21 1626          Gait Training Goal 1 (PT)    Activity/Assistive Device (Gait Training Goal 1, PT)  gait (walking locomotion);walker, rolling platform  -EL     [place] Level (Gait Training Goal 1, PT)  supervision required  -EL     Distance (Gait Training Goal 1, PT)  70  -EL     Time Frame (Gait Training Goal 1, PT)  long term goal (LTG);2 weeks  -EL       User Key  (r) = Recorded By, (t) = Taken By, (c) = Cosigned By    Initials Name Provider Type    Eric Bowman PT Physical Therapist        Clinical Impression     Row Name 02/04/21 1623          Pain    Additional Documentation  Pain Scale: FACES Pre/Post-Treatment (Group)  -EL     Row Name 02/04/21 1623          Pain Scale: FACES Pre/Post-Treatment    Pain: FACES Scale, Pretreatment  4-->hurts little more  -EL     Posttreatment Pain Rating  4-->hurts little more  -EL     Row Name 02/04/21 1623          Plan of Care Review    Plan of Care Reviewed With  patient  -EL     Outcome Summary  Pt is a 61 YO F admitted s/p R post LUIZA. Pt reports living with , is typically independent with ambulation using rollator, performs all ADLs, drives and reports one recent fall. Fall due to previous  rollator breaking. Pt this date continues to have significant sensation deficits due to nerve block. Pt instructed and assisted with LUIZA post op HEP and demonstrates good understanding and decent technique. Pt required MOD A to come to sitting EOB and was unable to come to standing this date due to nerve block. Anticipate d/c home with spouse and daughter following d/c. PPE worn includes glvoes and mask with goggles.  -     Row Name 02/04/21 1623          Therapy Assessment/Plan (PT)    Rehab Potential (PT)  good, to achieve stated therapy goals  -     Criteria for Skilled Interventions Met (PT)  yes  -EL     Row Name 02/04/21 1623          Vital Signs    O2 Delivery Pre Treatment  room air  -EL     O2 Delivery Intra Treatment  room air  -EL     O2 Delivery Post Treatment  room air  -EL     Pre Patient Position  Supine  -EL     Intra Patient Position  Sitting  -EL     Post Patient Position  Supine  -EL     Row Name 02/04/21 1623          Positioning and Restraints    Pre-Treatment Position  in bed  -EL     Post Treatment Position  bed  -EL     In Bed  notified nsg;supine;call light within reach;encouraged to call for assist;exit alarm on  -EL       User Key  (r) = Recorded By, (t) = Taken By, (c) = Cosigned By    Initials Name Provider Type    Eric Bowman PT Physical Therapist        Outcome Measures    No documentation.       Physical Therapy Education                 Title: PT OT SLP Therapies (Done)     Topic: Physical Therapy (Done)     Point: Mobility training (Done)     Learning Progress Summary           Patient Acceptance, E,TB, VU by  at 2/4/2021 1627                   Point: Precautions (Done)     Learning Progress Summary           Patient Acceptance, E,TB, VU by  at 2/4/2021 1627                               User Key     Initials Effective Dates Name Provider Type Discipline     06/23/20 -  Eric Marquez, PT Physical Therapist PT              PT Recommendation and Plan  Planned Therapy  Interventions (PT): balance training, neuromuscular re-education, transfer training, bed mobility training, patient/family education, strengthening  Plan of Care Reviewed With: patient  Outcome Summary: Pt is a 61 YO F admitted s/p R post LUIZA. Pt reports living with , is typically independent with ambulation using rollator, performs all ADLs, drives and reports one recent fall. Fall due to previous rollator breaking. Pt this date continues to have significant sensation deficits due to nerve block. Pt instructed and assisted with LUIZA post op HEP and demonstrates good understanding and decent technique. Pt required MOD A to come to sitting EOB and was unable to come to standing this date due to nerve block. Anticipate d/c home with spouse and daughter following d/c. PPE worn includes glvoes and mask with goggles.     Time Calculation:   PT Charges     Row Name 02/04/21 1628             Time Calculation    Start Time  1517  -EL      Stop Time  1538  -EL      Time Calculation (min)  21 min  -EL      PT Received On  02/04/21  -EL      PT - Next Appointment  02/05/21  -EL      PT Goal Re-Cert Due Date  02/18/21  -EL         Time Calculation- PT    Total Timed Code Minutes- PT  8 minute(s)  -EL        User Key  (r) = Recorded By, (t) = Taken By, (c) = Cosigned By    Initials Name Provider Type    Eric Bowman PT Physical Therapist        Therapy Charges for Today     Code Description Service Date Service Provider Modifiers Qty    69995240747 HC PT EVAL MOD COMPLEXITY 2 2/4/2021 Eric Marquez, PT GP 1    37537451249 HC PT THER PROC EA 15 MIN 2/4/2021 Eric Marquez PT GP 1               Eric Marquez PT  2/4/2021

## 2021-02-04 NOTE — THERAPY EVALUATION
Patient Name: Stefany Mancera  : 1960    MRN: [id number]                              Today's Date: 2021       Admit Date: 2021    Visit Dx: No diagnosis found.  Patient Active Problem List   Diagnosis   • Arthritis of right hip   • Diabetic peripheral neuropathy (CMS/HCC)   • Essential hypertension   • Mixed anxiety and depressive disorder   • Mixed hyperlipidemia   • Obesity   • Osteoarthritis   • Chronic kidney disease, stage 4 (severe) (CMS/HCC)   • Type 2 diabetes mellitus without complication (CMS/HCC)   • Vitamin D deficiency   • Osteoarthritis of right hip     Past Medical History:   Diagnosis Date   • Arthritis    • Chronic kidney disease, stage 4 (severe) (CMS/HCC) 2018   • Depression    • Diabetes mellitus (CMS/HCC)    • Hypertension    • Mixed anxiety and depressive disorder 2016     Past Surgical History:   Procedure Laterality Date   • CHOLECYSTECTOMY     • HYSTERECTOMY       General Information     Row Name 21          OT Time and Intention    Document Type  evaluation  -MP     Mode of Treatment  co-treatment;physical therapy;occupational therapy  -MP     Row Name 21 171          General Information    Patient Profile Reviewed  yes  -MP     Prior Level of Function  independent:;ADL's  -MP     Existing Precautions/Restrictions  hip, posterior  -MP     Row Name 21 171          Living Environment    Lives With  spouse  -MP     Row Name 21          Cognition    Orientation Status (Cognition)  oriented x 4  -MP     Row Name 21          Safety Issues, Functional Mobility    Impairments Affecting Function (Mobility)  balance;sensation/sensory awareness;endurance/activity tolerance;strength;pain;muscle tone abnormal  -MP       User Key  (r) = Recorded By, (t) = Taken By, (c) = Cosigned By    Initials Name Provider Type    MP Bayron Wilburn OT Occupational Therapist          Mobility/ADL's     Row Name 21          Bed  Mobility    Bed Mobility  supine-sit  -MP     Supine-Sit [place] (Bed Mobility)  moderate assist (50% patient effort)  -     Assistive Device (Bed Mobility)  bed rails;draw sheet  -[facility] [place] Name 02/04/21 1717          Transfers    Transfers  sit-stand transfer  -     Sit-Stand [place] (Transfers)  unable to assess  -[facility] [place] Name 02/04/21 1717          Activities of Daily Living    BADL Assessment/Intervention  lower body dressing  -[facility] [place] Name 02/04/21 1717          Mobility    Extremity Weight-bearing Status  right lower extremity  -     Right Lower Extremity (Weight-bearing Status)  weight-bearing as tolerated (WBAT)  -[facility] [place] Name 02/04/21 1717          Lower Body Dressing Assessment/Training    [place] Level (Lower Body Dressing)  don;doff;socks;dependent (less than 25% patient effort)  -       User Key  (r) = Recorded By, (t) = Taken By, (c) = Cosigned By    Initials Name Provider Type    Bayron Dupont OT Occupational Therapist        Obj/Interventions     [facility] Name 02/04/21 1718          Range of Motion Comprehensive    Comment, General Range of Motion  BUE WFL  -[facility] [place] Name 02/04/21 1718          Strength Comprehensive (MMT)    Comment, General Manual Muscle Testing (MMT) Assessment  BUE WFL  -[facility] [place] Name 02/04/21 1718          Balance    Static Sitting Balance  WFL  -       User Key  (r) = Recorded By, (t) = Taken By, (c) = Cosigned By    Initials Name Provider Type     Bayron Wilburn OT Occupational Therapist        Goals/Plan     [facility] Name 02/04/21 1721          Bed Mobility Goal 1 (OT)    Activity/Assistive Device (Bed Mobility Goal 1, OT)  bed mobility activities, all  -MP     [place] Level/Cues Needed (Bed Mobility Goal 1, OT)  contact guard assist  -MP     Time Frame (Bed Mobility Goal 1, OT)  long term goal (LTG);2 weeks  -[facility] [place] Name 02/04/21 1721          Transfer Goal 1 (OT)    Activity/Assistive Device (Transfer Goal 1, OT)   sit-to-stand/stand-to-sit;toilet  -MP     [place] Level/Cues Needed (Transfer Goal 1, OT)  contact guard assist  -MP     Time Frame (Transfer Goal 1, OT)  long term goal (LTG);2 weeks  -MP     Row Name 02/04/21 1721          Toileting Goal 1 (OT)    Activity/Device (Toileting Goal 1, OT)  toileting skills, all  -MP     [place] Level/Cues Needed (Toileting Goal 1, OT)  contact guard assist  -MP     Time Frame (Toileting Goal 1, OT)  long term goal (LTG);2 weeks  -MP       User Key  (r) = Recorded By, (t) = Taken By, (c) = Cosigned By    Initials Name Provider Type    Bayron Dupont OT Occupational Therapist        Clinical Impression     Row Name 02/04/21 1719          Pain Scale: FACES Pre/Post-Treatment    Pain: FACES Scale, Pretreatment  4-->hurts little more  -MP     Posttreatment Pain Rating  4-->hurts little more  -MP     Row Name 02/04/21 1719          Plan of Care Review    Plan of Care Reviewed With  patient  -MP     Progress  no change  -MP     Outcome Summary  Pt. is 59 y/o female admit POD # 0 R posterior LUIZA. Pt. states that she is ADL independent at baseline and utilzies rollator for ambulatory bathroom transfers. Pt. completes bed mobility w/ mod A supine to sit and sit to supine transfers. Pt. w/ increased difficulty this date and unable to complete standing activity secondary to nerve block w/ decreased sesnation/AROM RLE. Pt. will require education regarding AE for MOD I ADLs prior to d/c, anticipate d/c to home w/ family support/assist. Will follow up w/ pt. 1-3x per week at [facility].  -MP     Row Name 02/04/21 1719          Therapy Assessment/Plan (OT)    Rehab Potential (OT)  good, to achieve stated therapy goals  -MP     Criteria for Skilled Therapeutic Interventions Met (OT)  yes  -MP     Therapy Frequency (OT)  3 times/wk  -MP     Row Name 02/04/21 1719          Therapy Plan Review/Discharge Plan (OT)    Anticipated Discharge Disposition (OT)  home with 24/7 care  -MP     Row Name  02/04/21 1719          Vital Signs    Pre Patient Position  Supine  -MP     Intra Patient Position  Sitting  -MP     Post Patient Position  Supine  -MP     Row Name 02/04/21 1719          Positioning and Restraints    Pre-Treatment Position  in bed  -MP     Post Treatment Position  bed  -MP     In Bed  supine;call light within reach;encouraged to call for assist  -MP       User Key  (r) = Recorded By, (t) = Taken By, (c) = Cosigned By    Initials Name Provider Type    Bayron Dupont OT Occupational Therapist        Outcome Measures    No documentation.       Occupational Therapy Education                 Title: PT OT SLP Therapies (In Progress)     Topic: Occupational Therapy (In Progress)     Point: ADL training (Not Started)     Description:   Instruct learner(s) on proper safety adaptation and remediation techniques during self care or transfers.   Instruct in proper use of assistive devices.              Learner Progress:  Not documented in this visit.          Point: Home exercise program (Not Started)     Description:   Instruct learner(s) on appropriate technique for monitoring, assisting and/or progressing therapeutic exercises/activities.              Learner Progress:  Not documented in this visit.          Point: Precautions (Done)     Description:   Instruct learner(s) on prescribed precautions during self-care and functional transfers.              Learning Progress Summary           Patient Acceptance, E,TB, VU by MP at 2/4/2021 1722                   Point: Body mechanics (Not Started)     Description:   Instruct learner(s) on proper positioning and spine alignment during self-care, functional mobility activities and/or exercises.              Learner Progress:  Not documented in this visit.                      User Key     Initials Effective Dates Name Provider Type Discipline     03/01/19 -  Bayron Wilburn OT Occupational Therapist OT              OT Recommendation and Plan  Therapy  Frequency (OT): 3 times/wk  Plan of Care Review  Plan of Care Reviewed With: patient  Progress: no change  Outcome Summary: Pt. is 59 y/o female admit POD # 0 R posterior LUIZA. Pt. states that she is ADL independent at baseline and utilzies rollator for ambulatory bathroom transfers. Pt. completes bed mobility w/ mod A supine to sit and sit to supine transfers. Pt. w/ increased difficulty this date and unable to complete standing activity secondary to nerve block w/ decreased sesnation/AROM RLE. Pt. will require education regarding AE for MOD I ADLs prior to d/c, anticipate d/c to home w/ family support/assist. Will follow up w/ pt. 1-3x per week at [facility].     Time Calculation:   Time Calculation- OT     Row Name 02/04/21 1722             Time Calculation- OT    OT Start Time  1317  -MP      OT Stop Time  1338  -MP      OT Time Calculation (min)  21 min  -MP      Total Timed Code Minutes- OT  0 minute(s)  -MP      OT Received On  02/04/21  -      OT - Next Appointment  02/05/21  -      OT Goal Re-Cert Due Date  02/18/21  -        User Key  (r) = Recorded By, (t) = Taken By, (c) = Cosigned By    Initials Name Provider Type    Bayron Dupont OT Occupational Therapist        Therapy Charges for Today     Code Description Service Date Service Provider Modifiers Qty    77786870297 HC OT EVAL LOW COMPLEXITY 3 2/4/2021 Bayron Wilburn OT GO 1               Bayron Wilburn OT  2/4/2021

## 2021-02-04 NOTE — PLAN OF CARE
Goal Outcome Evaluation:        Outcome Summary: Pt just arrived from surgery with a total hip (R). Pt is resting well. Will continue to monitor.

## 2021-02-04 NOTE — ANESTHESIA PROCEDURE NOTES
Airway  Urgency: elective    Date/Time: 2/4/2021 9:30 AM    General Information and Staff    Patient location during procedure: OR    Indications and Patient Condition  Indications for airway management: airway protection    Preoxygenated: yes  MILS maintained throughout  Mask difficulty assessment: 1 - vent by mask    Final Airway Details  Final airway type: endotracheal airway      Successful airway: ETT    Successful intubation technique: direct laryngoscopy  Blade: Boaz  Blade size: 3  ETT size (mm): 7.0  Cormack-Lehane Classification: grade I - full view of glottis  Placement verified by: chest auscultation and capnometry   Measured from: gums  ETT/EBT to gums (cm): 20  Number of attempts at approach: 1  Assessment: atraumatic intubation

## 2021-02-04 NOTE — ANESTHESIA PROCEDURE NOTES
Peripheral Block    Pre-sedation assessment completed: 2/4/2021 9:00 AM    Patient reassessed immediately prior to procedure    Patient location during procedure: pre-op  Start time: 2/4/2021 9:00 AM  Stop time: 2/4/2021 9:15 AM  Reason for block: procedure for pain, at surgeon's request, post-op pain management and secondary anesthetic  Performed by  Anesthesiologist: Rod Hui DO  Preanesthetic Checklist  Completed: patient identified, site marked, surgical consent, pre-op evaluation, timeout performed, IV checked, risks and benefits discussed and monitors and equipment checked  Prep:  Pt Position: supine  Sterile barriers:cap, gloves, gown and mask  Prep: ChloraPrep  Patient monitoring: blood pressure monitoring, continuous pulse oximetry and EKG  Procedure  Sedation:no    Guidance:ultrasound guided  ULTRASOUND INTERPRETATION. Using ultrasound guidance a 20 G gauge needle was placed in close proximity to the nerve, at which point, under ultrasound guidance anesthetic was injected in the area of the nerve and spread of the anesthesia was seen on ultrasound in close proximity thereto.  There were no abnormalities seen on ultrasound; a digital image was taken; and the patient tolerated the procedure with no complications. Images:still images obtained, printed/placed on chart    Laterality:right  Block Type:fascia iliaca compartment  Injection Technique:single-shot  Needle Type:echogenic  Needle Gauge:20 G  Resistance on Injection: none    Medications Used: dexamethasone (DECADRON) injection, 4 mg  ropivacaine (NAROPIN) 0.5 % injection, 30 mL  Med admintered at 2/4/2021 9:12 AM      Post Assessment  Injection Assessment: negative aspiration for heme, no paresthesia on injection and incremental injection  Patient Tolerance:comfortable throughout block  Complications:no

## 2021-02-04 NOTE — CONSULTS
[facility]      Patient Name: Stefany Mancera  : 1960  MRN: [id number]  Primary Care Physician: Rodolfo Vizcaino MD  Date of admission: 2021    Patient Care Team:  Rodolfo Vizcaino MD as PCP - General (Family Medicine)          Subjective   History Present Illness     Chief Complaint: Right hip pain     Ms. Mancera is a 60 y.o. with a past medical history of hypertension, hyperlipidemia, anxiety and depression, CKD stage IV, and type 2 diabetes mellitus who presented to [facility] on 2021 to undergo surgery.  Patient underwent a right total hip arthroplasty by Dr. Madrigal.  Hospitalist were consulted for medical management.  Patient is currently on room air.  Patient reports 0 out of 10 right hip pain.  Pain medication has helped.  She denies any aggravating factors.  She has no other complaints at this time.      Review of Systems   Constitution: Negative.   HENT: Negative.    Cardiovascular: Negative.    Respiratory: Negative.    Skin: Negative.    Musculoskeletal: Negative.    Gastrointestinal: Negative.    Genitourinary: Negative.    Neurological: Negative.    Psychiatric/Behavioral: Negative.            Personal History     Past Medical History:   Past Medical History:   Diagnosis Date   • Arthritis    • Chronic kidney disease, stage 4 (severe) (CMS/HCC) 2018   • Depression    • Diabetes mellitus (CMS/HCC)    • Hypertension    • Mixed anxiety and depressive disorder 2016       Surgical History:      Past Surgical History:   Procedure Laterality Date   • CHOLECYSTECTOMY     • HYSTERECTOMY             Family History: family history includes Cancer in her mother; Heart disease in her father; Heart failure in her father. Otherwise pertinent FHx was reviewed and unremarkable.     Social History:  reports that she has never smoked. She has never used smokeless tobacco. She reports previous alcohol use. She reports that she  does not use drugs.      Medications:  Prior to Admission medications    Medication Sig Start Date End Date Taking? Authorizing Provider   aspirin 81 MG EC tablet Take 81 mg by mouth Daily.   Yes Alva Quintero MD   calcitriol (ROCALTROL) 0.25 MCG capsule Take 0.25 mcg by mouth Daily. HOLD DOS   Yes Alva Quintero MD   Cholecalciferol (Vitamin D3) 25 MCG (1000 UT) capsule Take 1,000 Units by mouth. HOLD DOS   Yes Provider, Historical, MD   ferrous gluconate (FERGON) 324 MG tablet Take 324 mg by mouth Daily With Breakfast. HOLD DOS   Yes Provider, Historical, MD   glipizide (GLUCOTROL) 5 MG tablet Take 5 mg by mouth 2 (Two) Times a Day Before Meals. HOLD DOS   Yes Provider, Historical, MD   hydrALAZINE (APRESOLINE) 25 MG tablet Take 25 mg by mouth Daily.   Yes Alva Quintero MD   hydroCHLOROthiazide (HYDRODIURIL) 25 MG tablet Take 25 mg by mouth Daily. HOLD DOS   Yes Alva Quintero MD   magnesium oxide (MAGOX) 400 (241.3 Mg) MG tablet tablet Take 400 mg by mouth Daily.   Yes Alva Quintero MD   PARoxetine (PAXIL) 40 MG tablet Take 40 mg by mouth Every Morning.   Yes Alva Quintero MD   pioglitazone (ACTOS) 30 MG tablet Take 30 mg by mouth Daily. HOLD DOS   Yes Provider, Historical, MD   pravastatin (PRAVACHOL) 20 MG tablet Take 20 mg by mouth Daily.   Yes Alva Quintero MD   sodium bicarbonate 325 MG tablet Take 325 mg by mouth 3 (Three) Times a Day.   Yes Alva Quintero MD   venlafaxine (EFFEXOR) 37.5 MG tablet Take 37.5 mg by mouth Daily.   Yes Alva Quintero MD   verapamil SR (CALAN-SR) 240 MG CR tablet Take 240 mg by mouth Every Night.   Yes Alva Quintero MD       Allergies:    Allergies   Allergen Reactions   • Irbesartan Other (See Comments)     Kidney damage    • Lisinopril Cough   • Metformin Other (See Comments)     Kidney damage        Objective   Objective     Vital Signs  Temp:  [97.5 °F (36.4 °C)-98.1 °F (36.7 °C)] 98.1 °F (36.7  °C)  Heart Rate:  [77-90] 89  Resp:  [14-21] 15  BP: (115-155)/(47-74) 126/73  SpO2:  [95 %-100 %] 95 %  on  Flow (L/min):  [2-8] 2;   Device (Oxygen Therapy): room air  Body mass index is 38.32 kg/m².    Physical Exam  Vitals signs reviewed.   Constitutional:       Appearance: Normal appearance. She is obese.   HENT:      Head: Normocephalic and atraumatic.      Nose: Nose normal.      Mouth/Throat:      Mouth: Mucous membranes are moist.      Pharynx: Oropharynx is clear.   Eyes:      Extraocular Movements: Extraocular movements intact.      Conjunctiva/sclera: Conjunctivae normal.      Pupils: Pupils are equal, round, and reactive to light.   Neck:      Musculoskeletal: Normal range of motion.   Cardiovascular:      Rate and Rhythm: Normal rate and regular rhythm.      Pulses: Normal pulses.      Heart sounds: Normal heart sounds.      Comments: S1, S2 audible  Pulmonary:      Effort: Pulmonary effort is normal.      Breath sounds: Normal breath sounds.      Comments: On room air   Abdominal:      General: Abdomen is flat. Bowel sounds are normal.      Palpations: Abdomen is soft.   Musculoskeletal: Normal range of motion.      Comments: Right hip dressing in place   Skin:     General: Skin is warm and dry.   Neurological:      General: No focal deficit present.      Mental Status: She is alert and oriented to person, place, and time. Mental status is at baseline.   Psychiatric:         Mood and Affect: Mood normal.         Behavior: Behavior normal.         Thought Content: Thought content normal.         Judgment: Judgment normal.           Results Review:  I have personally reviewed most recent cardiac tracings, lab results and radiology images and interpretations and agree with findings.              Invalid input(s):  ALKPHOS  Estimated Creatinine Clearance: 47.7 mL/min (A) (by C-G formula based on SCr of 1.66 mg/dL (H)).  Brief Urine Lab Results  (Last result in the past 365 days)      Color   Clarity    Blood   Leuk Est   Nitrite   Protein   CREAT   Urine HCG        01/28/21 1031 Yellow Clear Negative Negative Negative Trace               Microbiology Results (last 10 days)     Procedure Component Value - Date/Time    COVID PRE-OP / PRE-PROCEDURE SCREENING ORDER (NO ISOLATION) - Swab, Nasopharynx [[id number]]  (Normal) Collected: 02/02/21 1005    Lab Status: Final result Specimen: Swab from Nasopharynx Updated: 02/02/21 2007    Narrative:      The following orders were created for panel order COVID PRE-OP / PRE-PROCEDURE SCREENING ORDER (NO ISOLATION) - Swab, Nasopharynx.  Procedure                               Abnormality         Status                     ---------                               -----------         ------                     COVID-19,APTIMA PANTHER,...[[id number]]  Normal              Final result                 Please view results for these tests on the individual orders.    COVID-19,APTIMA PANTHER,[facility] IN-HOUSE, NP/OP SWAB IN UTM/VTM/SALINE TRANSPORT MEDIA,24 HR TAT - Swab, Nasopharynx [[id number]]  (Normal) Collected: 02/02/21 1005    Lab Status: Final result Specimen: Swab from Nasopharynx Updated: 02/02/21 2007     COVID19 Not Detected    Narrative:      Fact sheet for providers: https://www.fda.gov/media/463784/download     Fact sheet for patients: https://www.fda.gov/media/900670/download    Test performed by PCR.    MRSA Screen, PCR (Inpatient) - Swab, Nares [[id number]]  (Normal) Collected: 01/28/21 1031    Lab Status: Final result Specimen: Swab from Nares Updated: 01/28/21 1232     MRSA PCR No MRSA Detected          ECG/EMG Results (most recent)     None                    Xr Hip 1 View Without Pelvis Right (surgery Only)    Result Date: 2/4/2021  Please see operative physician report. This is a mid procedure radiograph demonstrating the femoral component placement device. The acetabular component appears to have normal position on this exam.  Electronically Signed By-Lillie Chavez MD  On:2/4/2021 1:44 PM This report was finalized on [id number] by  Lillie Chavez MD.    Xr Chest Pa & Lateral    Result Date: 1/28/2021  IMPRESSION : 1.No definite acute pulmonary process.  Electronically Signed By-Germain Aguilar MD On:1/28/2021 11:44 AM This report was finalized on [id number] by  Germain Aguilar MD.    Xr Hip With Or Without Pelvis 2 - 3 View Right    Result Date: 2/4/2021  Expected, immediate postoperative appearance of the right total hip arthroplasty. No acute, location on radiograph.  Electronically Signed By-Lillie Chavez MD On:2/4/2021 1:52 PM This report was finalized on [id number] by  Lillie Chavez MD.        Estimated Creatinine Clearance: 47.7 mL/min (A) (by C-G formula based on SCr of 1.66 mg/dL (H)).    Assessment/Plan   Assessment/Plan       Active Hospital Problems    Diagnosis  POA   • **Arthritis of right hip [M16.11]  Yes     Priority: High   • Osteoarthritis of right hip [M16.11]  Yes   • Mixed hyperlipidemia [E78.2]  Yes   • Diabetic peripheral neuropathy (CMS/HCC) [E11.42]  Yes   • Type 2 diabetes mellitus without complication (CMS/HCC) [E11.9]  Yes   • Obesity [E66.9]  Yes   • Chronic kidney disease, stage 4 (severe) (CMS/HCC) [N18.4]  Yes   • Essential hypertension [I10]  Yes   • Mixed anxiety and depressive disorder [F41.8]  Yes      Resolved Hospital Problems   No resolved problems to display.     Arthritis of the right hip S/P Right total hip replacement  -Neurovascular checks  -Encourage incentive spirometry  -Pain medication per Ortho  - Fall risk precautions  -PT/OT ordered  -Ortho following    Essential hypertension  - Controlled   -Monitor blood pressure  -Continue hydralazine, hydrochlorothiazide, and verapamil  - IV hydralazine ordered PRN     Hyperlipidemia  -Continue statin    Type 2 diabetes mellitus  -Start sliding scale, Accu-Cheks AC at bedtime  -Hemoglobin A1C 10.5  -Holding home glipizide and actos    CKD Stage IV   - Last CR 1.6 on 1/28/2021   -Continue sodium  bicarbonate and calcitriol     Iron deficiency anemia  - Continue ferrous sulfate  - Check CBC in am     Chronic hypomagnesemia  -Check mag  -Continue Mag-Ox    Anxiety with depression   - Stable   -Continue Effexor and Paxil    Obesity  -BMI 38.3  -Encourage lifestyle modifications            VTE Prophylaxis -   Mechanical Order History:      Ordered        02/04/21 1216  Place Venous Foot Pump  Once         02/04/21 1216  Maintain Venous Foot Pump  Once                 Pharmalogical Order History:     None          CODE STATUS:    Code Status and Medical Interventions:   Ordered at: 02/04/21 1454     Code Status:    CPR     Medical Interventions (Level of Support Prior to Arrest):    Full       This patient has been examined wearing appropriate Personal Protective Equipment. 02/04/21      I discussed the patient's findings and my recommendations with patient and nursing staff.      Signature: Electronically signed by MARCELO Ceja, 02/04/21, 5:42 PM EST.    [facility]ist Team

## 2021-02-05 VITALS
WEIGHT: 256 LBS | HEIGHT: 68 IN | HEART RATE: 81 BPM | TEMPERATURE: 98.2 F | DIASTOLIC BLOOD PRESSURE: 89 MMHG | RESPIRATION RATE: 19 BRPM | SYSTOLIC BLOOD PRESSURE: 151 MMHG | OXYGEN SATURATION: 100 % | BODY MASS INDEX: 38.8 KG/M2

## 2021-02-05 LAB
ALBUMIN SERPL-MCNC: 3.2 G/DL (ref 3.5–5.2)
ALBUMIN/GLOB SERPL: 0.7 G/DL
ALP SERPL-CCNC: 86 U/L (ref 39–117)
ALT SERPL W P-5'-P-CCNC: 6 U/L (ref 1–33)
ANION GAP SERPL CALCULATED.3IONS-SCNC: 12 MMOL/L (ref 5–15)
AST SERPL-CCNC: 19 U/L (ref 1–32)
BASOPHILS # BLD AUTO: 0 10*3/MM3 (ref 0–0.2)
BASOPHILS NFR BLD AUTO: 0.2 % (ref 0–1.5)
BILIRUB SERPL-MCNC: 0.2 MG/DL (ref 0–1.2)
BUN SERPL-MCNC: 33 MG/DL (ref 8–23)
BUN/CREAT SERPL: 19.3 (ref 7–25)
CALCIUM SPEC-SCNC: 7.6 MG/DL (ref 8.6–10.5)
CHLORIDE SERPL-SCNC: 100 MMOL/L (ref 98–107)
CO2 SERPL-SCNC: 24 MMOL/L (ref 22–29)
CREAT SERPL-MCNC: 1.71 MG/DL (ref 0.57–1)
DEPRECATED RDW RBC AUTO: 43.3 FL (ref 37–54)
EOSINOPHIL # BLD AUTO: 0 10*3/MM3 (ref 0–0.4)
EOSINOPHIL NFR BLD AUTO: 0 % (ref 0.3–6.2)
ERYTHROCYTE [DISTWIDTH] IN BLOOD BY AUTOMATED COUNT: 16 % (ref 12.3–15.4)
GFR SERPL CREATININE-BSD FRML MDRD: 30 ML/MIN/1.73
GLOBULIN UR ELPH-MCNC: 4.4 GM/DL
GLUCOSE BLDC GLUCOMTR-MCNC: 130 MG/DL (ref 70–105)
GLUCOSE BLDC GLUCOMTR-MCNC: 153 MG/DL (ref 70–105)
GLUCOSE SERPL-MCNC: 171 MG/DL (ref 65–99)
HCT VFR BLD AUTO: 26.9 % (ref 34–46.6)
HGB BLD-MCNC: 8.8 G/DL (ref 12–15.9)
LYMPHOCYTES # BLD AUTO: 1.6 10*3/MM3 (ref 0.7–3.1)
LYMPHOCYTES NFR BLD AUTO: 10.7 % (ref 19.6–45.3)
MAGNESIUM SERPL-MCNC: 1 MG/DL (ref 1.6–2.4)
MCH RBC QN AUTO: 25.2 PG (ref 26.6–33)
MCHC RBC AUTO-ENTMCNC: 32.6 G/DL (ref 31.5–35.7)
MCV RBC AUTO: 77.3 FL (ref 79–97)
MONOCYTES # BLD AUTO: 0.9 10*3/MM3 (ref 0.1–0.9)
MONOCYTES NFR BLD AUTO: 6 % (ref 5–12)
NEUTROPHILS NFR BLD AUTO: 12.2 10*3/MM3 (ref 1.7–7)
NEUTROPHILS NFR BLD AUTO: 83.1 % (ref 42.7–76)
NRBC BLD AUTO-RTO: 0 /100 WBC (ref 0–0.2)
PLATELET # BLD AUTO: 251 10*3/MM3 (ref 140–450)
PMV BLD AUTO: 7.7 FL (ref 6–12)
POTASSIUM SERPL-SCNC: 3.2 MMOL/L (ref 3.5–5.2)
PROT SERPL-MCNC: 7.6 G/DL (ref 6–8.5)
RBC # BLD AUTO: 3.48 10*6/MM3 (ref 3.77–5.28)
SODIUM SERPL-SCNC: 136 MMOL/L (ref 136–145)
WBC # BLD AUTO: 14.7 10*3/MM3 (ref 3.4–10.8)

## 2021-02-05 PROCEDURE — 97110 THERAPEUTIC EXERCISES: CPT

## 2021-02-05 PROCEDURE — 97116 GAIT TRAINING THERAPY: CPT

## 2021-02-05 PROCEDURE — 97535 SELF CARE MNGMENT TRAINING: CPT

## 2021-02-05 PROCEDURE — 63710000001 INSULIN LISPRO (HUMAN) PER 5 UNITS: Performed by: NURSE PRACTITIONER

## 2021-02-05 PROCEDURE — 25010000002 CEFAZOLIN PER 500 MG: Performed by: ORTHOPAEDIC SURGERY

## 2021-02-05 PROCEDURE — 82962 GLUCOSE BLOOD TEST: CPT

## 2021-02-05 PROCEDURE — 83735 ASSAY OF MAGNESIUM: CPT | Performed by: NURSE PRACTITIONER

## 2021-02-05 PROCEDURE — 80053 COMPREHEN METABOLIC PANEL: CPT | Performed by: NURSE PRACTITIONER

## 2021-02-05 PROCEDURE — 97530 THERAPEUTIC ACTIVITIES: CPT

## 2021-02-05 PROCEDURE — 85025 COMPLETE CBC W/AUTO DIFF WBC: CPT | Performed by: NURSE PRACTITIONER

## 2021-02-05 RX ORDER — ASPIRIN 325 MG
325 TABLET, DELAYED RELEASE (ENTERIC COATED) ORAL EVERY 12 HOURS SCHEDULED
Start: 2021-02-05 | End: 2021-02-19

## 2021-02-05 RX ORDER — OXYCODONE HYDROCHLORIDE AND ACETAMINOPHEN 5; 325 MG/1; MG/1
TABLET ORAL
Qty: 30 TABLET | Refills: 0 | Status: ON HOLD | OUTPATIENT
Start: 2021-02-05 | End: 2021-10-06 | Stop reason: SDUPTHER

## 2021-02-05 RX ORDER — GABAPENTIN 300 MG/1
300 CAPSULE ORAL NIGHTLY PRN
Qty: 14 CAPSULE | Refills: 1 | Status: ON HOLD | OUTPATIENT
Start: 2021-02-05 | End: 2021-10-06

## 2021-02-05 RX ORDER — DOXYCYCLINE 100 MG/1
100 CAPSULE ORAL 2 TIMES DAILY
Qty: 10 CAPSULE | Refills: 0 | Status: SHIPPED | OUTPATIENT
Start: 2021-02-05 | End: 2022-10-12

## 2021-02-05 RX ADMIN — SODIUM CHLORIDE 125 ML/HR: 9 INJECTION, SOLUTION INTRAVENOUS at 01:02

## 2021-02-05 RX ADMIN — POTASSIUM CHLORIDE 40 MEQ: 1500 TABLET, EXTENDED RELEASE ORAL at 08:29

## 2021-02-05 RX ADMIN — CALCITRIOL 0.25 MCG: 0.25 CAPSULE ORAL at 08:30

## 2021-02-05 RX ADMIN — VENLAFAXINE 37.5 MG: 75 TABLET ORAL at 08:29

## 2021-02-05 RX ADMIN — ASPIRIN 325 MG: 325 TABLET, COATED ORAL at 08:28

## 2021-02-05 RX ADMIN — PAROXETINE HYDROCHLORIDE 40 MG: 40 TABLET, FILM COATED ORAL at 08:29

## 2021-02-05 RX ADMIN — HYDRALAZINE HYDROCHLORIDE 25 MG: 25 TABLET, FILM COATED ORAL at 08:28

## 2021-02-05 RX ADMIN — Medication 3 ML: at 08:30

## 2021-02-05 RX ADMIN — ACETAMINOPHEN 325 MG: 325 TABLET, FILM COATED ORAL at 08:29

## 2021-02-05 RX ADMIN — Medication 3 ML: at 01:03

## 2021-02-05 RX ADMIN — ATORVASTATIN CALCIUM 10 MG: 10 TABLET, FILM COATED ORAL at 08:28

## 2021-02-05 RX ADMIN — HYDROCHLOROTHIAZIDE 25 MG: 25 TABLET ORAL at 08:29

## 2021-02-05 RX ADMIN — MAGNESIUM GLUCONATE 500 MG ORAL TABLET 400 MG: 500 TABLET ORAL at 08:29

## 2021-02-05 RX ADMIN — INSULIN LISPRO 2 UNITS: 100 INJECTION, SOLUTION INTRAVENOUS; SUBCUTANEOUS at 07:41

## 2021-02-05 RX ADMIN — SODIUM BICARBONATE 650 MG TABLET 325 MG: at 08:29

## 2021-02-05 RX ADMIN — CEFAZOLIN SODIUM 2 G: 10 INJECTION, POWDER, FOR SOLUTION INTRAVENOUS at 01:02

## 2021-02-05 RX ADMIN — POTASSIUM CHLORIDE 40 MEQ: 1500 TABLET, EXTENDED RELEASE ORAL at 12:29

## 2021-02-05 NOTE — THERAPY TREATMENT NOTE
.Subjective: Pt agreeable to therapeutic plan of care.    Objective:     Bed mobility - CGA  Transfers - CGA and with rolling walker  Ambulation - 40 feet CGA and with rolling walker  WBAT RLE    Pain: 5 VAS  Education: Provided education on importance of mobility and skilled verbal / tactile cueing throughout intervention.   Pt following all LUIZA precautions    Assessment: Stefany Mancera presents with functional mobility impairments which indicate the need for skilled intervention. Pt required less assist for transfers this afternoon.  Pt was able to alex increased gait distance. Pt was independent with upper body dressing and mod assist for lower body dressing.  Tolerating session today without incident. Will continue to follow and progress as tolerated.     Plan/Recommendations:   Pt would benefit from Home with family assist and and Home Health at discharge from facility and requires no DME at discharge.   Pt desires Home with family assist and and Home Health at discharge. Pt cooperative; agreeable to therapeutic recommendations and plan of care       Post-Tx Position: Supine with HOB Elevated, Alarms activated and Call light and personal items within reach  PPE: gloves, surgical mask, eyewear protection

## 2021-02-05 NOTE — PLAN OF CARE
Goal Outcome Evaluation:        Bed mobility - CGA  Sit to supine with pt sitting on clean trash can liner to reduce friction of scooting and pivoting on her bottom back to bed.  Transfers - Min assist up to rolling walker from low recliner chair.  To bs comode first.  cga off elevated comode.  Ambulation - 15 feet CGA and with rolling walker  WBAT RLE.  Step to gait pattern,  Short step length    Plan/Recommendations:   Pt would benefit from Home with family assist and and Home Health at discharge from facility and requires no DME at discharge.   Pt desires Home with family assist and and Home Health at discharge. Pt cooperative; agreeable to therapeutic recommendations and plan of care.

## 2021-02-05 NOTE — PROGRESS NOTES
Discharge Planning Assessment  [facility]     Patient Name: Stefany Mancera  MRN: [id number]  Today's Date: 2/5/2021    Admit Date: 2/4/2021    Discharge Needs Assessment     Row Name 02/05/21 1312       Resource/Environmental Concerns    Transportation Concerns  car, none       Discharge Needs Assessment    Anticipated Changes Related to Illness  none    Equipment Needed After Discharge  -- 3N1    Discharge Coordination/Progress  Referral for 3N1 for [facility]        Discharge Plan     Row Name 02/05/21 1313       Plan    Plan  Home with spouse. Patient denies any needs for outpatient rehab. Referral for 3N1 for [facility].    Patient/Family in Agreement with Plan  yes    Plan Comments  Patient verified her PCP, current DME, patient is currently thinking about getting a covid shotl Anticipate discharge today.        Continued Care and Services - Admitted Since 2/4/2021     Durable Medical Equipment     Service Provider Request Status Selected Services Address Phone Fax Patient Preferred    [facility]  Pending - Request Sent N/A [address], [place] [id number] [phone] --              Expected Discharge Date and Time     Expected Discharge Date Expected Discharge Time    Feb 5, 2021               Met with patient in room wearing PPE: mask, face shield/goggles, gloves, gown.      Maintained distance greater than six feet and spent less than 15 minutes in the room.        Anna Naegele RN Case Manager  [place] [id number] [place]  [address] [phone]  office  [phone]  fax  [email or web address]

## 2021-02-05 NOTE — THERAPY TREATMENT NOTE
Patient Name: Stefany Mancera  : 1960    MRN: [id number]                              Today's Date: 2021       Admit Date: 2021    Visit Dx:     ICD-10-CM ICD-9-CM   1. Arthritis of right hip  M16.11 716.95     Patient Active Problem List   Diagnosis   • Arthritis of right hip   • Diabetic peripheral neuropathy (CMS/HCC)   • Essential hypertension   • Mixed anxiety and depressive disorder   • Mixed hyperlipidemia   • Obesity   • Osteoarthritis   • Chronic kidney disease, stage 4 (severe) (CMS/HCC)   • Type 2 diabetes mellitus without complication (CMS/HCC)   • Vitamin D deficiency   • Osteoarthritis of right hip     Past Medical History:   Diagnosis Date   • Arthritis    • Chronic kidney disease, stage 4 (severe) (CMS/HCC) 2018   • Depression    • Diabetes mellitus (CMS/HCC)    • Hypertension    • Mixed anxiety and depressive disorder 2016     Past Surgical History:   Procedure Laterality Date   • CHOLECYSTECTOMY     • HYSTERECTOMY     • TOTAL HIP ARTHROPLASTY Right 2021    Procedure: TOTAL HIP ARTHROPLASTY;  Surgeon: Ángel Madrigal MD;  Location: [facility];  Service: Orthopedics;  Laterality: Right;     General Information     Row Name 21 1715          OT Time and Intention    Document Type  therapy note (daily note)  -MP     Mode of Treatment  occupational therapy  -MP       User Key  (r) = Recorded By, (t) = Taken By, (c) = Cosigned By    Initials Name Provider Type    MP Bayron Wilburn OT Occupational Therapist          Mobility/ADL's     Row Name 21 1715          Bed Mobility    Supine-Sit [place]son (Bed Mobility)  moderate assist (50% patient effort);1 person assist  -MP     Row Name 21 1715          Lower Body Dressing Assessment/Training    Anson Level (Lower Body Dressing)  don;doff;socks;modified independence;set up;supervision  -MP     Assistive Devices (Lower Body Dressing)  sock-aid;reacher  -MP       User Key  (r) = Recorded By, (t) =  Taken By, (c) = Cosigned By    Initials Name Provider Type    Bayorn Dupont OT Occupational Therapist        Obj/Interventions     Row Name 02/05/21 1715          Balance    Static Sitting Balance  WFL  -MP     Balance Interventions  sitting;supported;static;dynamic;occupation based/functional task  -MP       User Key  (r) = Recorded By, (t) = Taken By, (c) = Cosigned By    Initials Name Provider Type    Bayron Dupont OT Occupational Therapist        Goals/Plan    No documentation.       Clinical Impression     Row Name 02/05/21 1716          Pain Scale: FACES Pre/Post-Treatment    Pain: FACES Scale, Pretreatment  2-->hurts little bit  -MP     Posttreatment Pain Rating  2-->hurts little bit  -MP     Row Name 02/05/21 1716          Plan of Care Review    Plan of Care Reviewed With  patient  -MP     Progress  improving  -MP     Outcome Summary  Pt. demonstrates progress this date w/ LB dressing technique following distribution and education on use of AE. Pt. utilzies SA  and long handeled reach for donning/doffing socks. Pt. educated on use of long handeled sponge and shoe horn. Recommend 3-in-1 commode for home to improve functional mobility w/ bathroom transfers. Pt. to have 24 hour care/support at home.  -MP     Row Name 02/05/21 1716          Therapy Assessment/Plan (OT)    Rehab Potential (OT)  good, to achieve stated therapy goals  -MP     Therapy Frequency (OT)  3 times/wk  -MP     Row Name 02/05/21 1716          Therapy Plan Review/Discharge Plan (OT)    Anticipated Discharge Disposition (OT)  home with 24/7 care  -MP     Row Name 02/05/21 1716          Vital Signs    Pre Patient Position  Supine  -MP     Intra Patient Position  Sitting  -MP     Post Patient Position  Sitting  -MP     Row Name 02/05/21 1716          Positioning and Restraints    Pre-Treatment Position  in bed  -MP     Post Treatment Position  chair  -MP     In Bed  sitting EOB;call light within reach;with family/caregiver   -       User Key  (r) = Recorded By, (t) = Taken By, (c) = Cosigned By    Initials Name Provider Type    Bayron Dupont OT Occupational Therapist        Outcome Measures    No documentation.       Occupational Therapy Education                 Title: PT OT SLP Therapies (Resolved)     Topic: Occupational Therapy (Resolved)     Point: ADL training (Resolved)     Description:   Instruct learner(s) on proper safety adaptation and remediation techniques during self care or transfers.   Instruct in proper use of assistive devices.              Learner Progress:  Not documented in this visit.          Point: Home exercise program (Resolved)     Description:   Instruct learner(s) on appropriate technique for monitoring, assisting and/or progressing therapeutic exercises/activities.              Learner Progress:  Not documented in this visit.          Point: Precautions (Resolved)     Description:   Instruct learner(s) on prescribed precautions during self-care and functional transfers.              Learning Progress Summary           Patient Acceptance, E,TB, VU by ADAN at 2/4/2021 1722                   Point: Body mechanics (Resolved)     Description:   Instruct learner(s) on proper positioning and spine alignment during self-care, functional mobility activities and/or exercises.              Learner Progress:  Not documented in this visit.                      User Key     Initials Effective Dates Name Provider Type Discipline     03/01/19 -  Bayron Wilburn OT Occupational Therapist OT              OT Recommendation and Plan  Therapy Frequency (OT): 3 times/wk  Plan of Care Review  Plan of Care Reviewed With: patient  Progress: improving  Outcome Summary: Pt. demonstrates progress this date w/ LB dressing technique following distribution and education on use of AE. Pt. utilzies SA  and long handeled reach for donning/doffing socks. Pt. educated on use of long handeled sponge and shoe horn. Recommend  3-in-1 commode for home to improve functional mobility w/ bathroom transfers. Pt. to have 24 hour care/support at home.     Time Calculation:   Time Calculation- OT     Row Name 02/05/21 1718 02/05/21 1405 02/05/21 1356       Time Calculation- OT    OT Start Time  1250  -MP  --  --    OT Stop Time  1305  -MP  --  --    OT Time Calculation (min)  15 min  -MP  --  --    Total Timed Code Minutes- OT  15 minute(s)  -MP  --  --    OT Received On  02/05/21  -  --  --    OT - Next Appointment  02/08/21  -  --  --       Timed Charges    88293 - Gait Training Minutes   --  14  -SC  12  -SC      User Key  (r) = Recorded By, (t) = Taken By, (c) = Cosigned By    Initials Name Provider Type    MP Bayron Wilburn OT Occupational Therapist    Melyssa Carcamo, PTA Physical Therapy Assistant        Therapy Charges for Today     Code Description Service Date Service Provider Modifiers Qty    [id number]  OT EVAL LOW COMPLEXITY 3 2/4/2021 Bayron Wilburn OT GO 1    [id number]  OT SELF CARE/MGMT/TRAIN EA 15 MIN 2/5/2021 Bayron Wilburn OT GO 1               Bayron Wilburn OT  2/5/2021

## 2021-02-05 NOTE — DISCHARGE SUMMARY
Date of admission: 2/4/2021    Date of Discharge:  2/5/2021    Discharge Diagnosis: Right hip degenerative joint disease    Procedures Performed    Procedure(s):  TOTAL HIP ARTHROPLASTY,  right  -------------------       Presenting Problem/History of Present Illness  Active Hospital Problems    Diagnosis  POA   • **Arthritis of right hip [M16.11]  Yes   • Osteoarthritis of right hip [M16.11]  Yes   • Mixed hyperlipidemia [E78.2]  Yes   • Diabetic peripheral neuropathy (CMS/HCC) [E11.42]  Yes   • Type 2 diabetes mellitus without complication (CMS/HCC) [E11.9]  Yes   • Obesity [E66.9]  Yes   • Chronic kidney disease, stage 4 (severe) (CMS/HCC) [N18.4]  Yes   • Essential hypertension [I10]  Yes   • Mixed anxiety and depressive disorder [F41.8]  Yes      Resolved Hospital Problems   No resolved problems to display.          Hospital Course  Patient is a 60 y.o. female presented with right hip DJD.  Underwent total hip replacement.  Did well postoperatively.  Was able to walk well.  Shows good prosthesis.        Consults:   Consults     No orders found for last 30 day(s).          Pertinent Test Results:     Condition on Discharge:  Stable            Discharge Disposition  Home with home health therapy    Discharge Medications     Discharge Medications      New Medications      Instructions Start Date   doxycycline 100 MG capsule  Commonly known as: MONODOX   100 mg, Oral, 2 Times Daily      gabapentin 300 MG capsule  Commonly known as: Neurontin   300 mg, Oral, Nightly PRN      oxyCODONE-acetaminophen 5-325 MG per tablet  Commonly known as: PERCOCET   1 or 2 tablets every 6 hours as needed         Changes to Medications      Instructions Start Date   aspirin  MG tablet  What changed:   · medication strength  · how much to take  · when to take this   325 mg, Oral, Every 12 Hours Scheduled         Continue These Medications      Instructions Start Date   calcitriol 0.25 MCG capsule  Commonly known as: ROCALTROL    0.25 mcg, Oral, Daily, HOLD DOS       glipizide 5 MG tablet  Commonly known as: GLUCOTROL   5 mg, Oral, 2 Times Daily Before Meals, HOLD DOS       hydrALAZINE 25 MG tablet  Commonly known as: APRESOLINE   25 mg, Oral, Daily      hydroCHLOROthiazide 25 MG tablet  Commonly known as: HYDRODIURIL   25 mg, Oral, Daily, HOLD DOS       magnesium oxide 400 (241.3 Mg) MG tablet tablet  Commonly known as: MAGOX   400 mg, Oral, Daily      PARoxetine 40 MG tablet  Commonly known as: PAXIL   40 mg, Oral, Every Morning      pioglitazone 30 MG tablet  Commonly known as: ACTOS   30 mg, Oral, Daily, HOLD DOS       pravastatin 20 MG tablet  Commonly known as: PRAVACHOL   20 mg, Oral, Daily      sodium bicarbonate 325 MG tablet   325 mg, Oral, 3 Times Daily      venlafaxine 37.5 MG tablet  Commonly known as: EFFEXOR   37.5 mg, Oral, Daily      verapamil  MG CR tablet  Commonly known as: CALAN-SR   240 mg, Oral, Nightly      Vitamin D3 25 MCG (1000 UT) capsule   1,000 Units, Oral, HOLD DOS          Stop These Medications    ferrous gluconate 324 MG tablet  Commonly known as: FERGON            Discharge Diet:   Diet Instructions     Diet: Regular      Discharge Diet: Regular          Activity at Discharge: Weightbearing as tolerated with total hip precautions May shower postop day 4  Follow-up Appointments  No future appointments.  Additional Instructions for the Follow-ups that You Need to Schedule     Ambulatory Referral to Home Health   As directed      Evaluate and treat status post right total hip 3 times per week for 2 weeks, weight-bear as tolerated with posterior total hip cautions    Order Comments: Evaluate and treat status post right total hip 3 times per week for 2 weeks, weight-bear as tolerated with posterior total hip cautions     Face to Face Visit Date: 2/5/2021    Follow-up provider for Plan of Care?: I will be treating the patient on an ongoing basis.  Please send me the Plan of Care for signature.    Follow-up  provider: JUANCARLOS MADRIGAL [[id number]]    Reason/Clinical Findings: Status post right total hip    Describe mobility limitations that make leaving home difficult: Weakness and pain    Nursing/Therapeutic Services Requested: Physical Therapy    Frequency: Other         Call MD for problems / concerns.   As directed      Instructions: Wound drainage beyond 5 days    Order Comments: Instructions: Wound drainage beyond 5 days          Discharge Follow-up with Specialty: tere 10-12 days postop   As directed      Specialty: tere 10-12 days postop               Test Results Pending at Discharge       Juancarlos Madrigal MD  02/05/21  18:04 EST

## 2021-02-05 NOTE — PLAN OF CARE
Goal Outcome Evaluation:        Outcome Summary: Pt is being discharged. Pt has no complaints. Will continue to monitor

## 2021-02-05 NOTE — DISCHARGE PLACEMENT REQUEST
"Stefany Diaz (60 y.o. Female)     Date of Birth Social Security Number Address Home Phone MRN    1960  [id number]  [place] [address] [phone] [id number]    Druze Marital Status          Islam        Admission Date Admission Type Admitting Provider Attending Provider Department, Room/Bed    2/4/21 Elective Ángel Madrigal MD Conner, John M, MD [facility] SURGICAL INPATIENT, 4102/1    Discharge Date Discharge Disposition Discharge Destination         Home-[facility]              Attending Provider: Ángel Madrigal MD    Allergies: Irbesartan, Lisinopril, Metformin    Isolation: None   Infection: None   Code Status: CPR    Ht: 172.7 cm (68\")   Wt: 116 kg (256 lb)    Admission Cmt: None   Principal Problem: Arthritis of right hip [M16.11]                 Active Insurance as of 2/4/2021     Primary Coverage     Payor Plan Insurance Group Employer/Plan Group    [id number]     Payor Plan Address Payor Plan Phone Number Payor Plan Fax Number Effective Dates    [address] [phone]  1/1/2021 - None Entered    [facility] [id number]       Subscriber Name Subscriber Birth Date Member ID       OLEKSANDR DIAZ 3/11/1959 [id number]                 Emergency Contacts      (Rel.) Home Phone Work Phone Mobile Phone    BRENNANZIONOLEKSANDR VALDEZ (Spouse) -- -- [phone]              "

## 2021-02-05 NOTE — PLAN OF CARE
Patient states that the block from surgery I wearing off and is starting to have mild ain. Pain was able to get up to the bedside commode with assistive persons x 2. Patient is resting in bed at this time.

## 2021-02-05 NOTE — PLAN OF CARE
Goal Outcome Evaluation:  Plan of Care Reviewed With: patient  Progress: improving  Outcome Summary: Pt. demonstrates progress this date w/ LB dressing technique following distribution and education on use of AE. Pt. utilzies SA  and long handeled reach for donning/doffing socks. Pt. educated on use of long handeled sponge and shoe horn. Recommend 3-in-1 commode for home to improve functional mobility w/ bathroom transfers. Pt. to have 24 hour care/support at home.    PPE: gloves, mask ,face shield

## 2021-02-05 NOTE — PLAN OF CARE
Goal Outcome Evaluation:        Bed mobility - CGA  Transfers - CGA and with rolling walker  Ambulation - 40 feet CGA and with rolling walker  WBAT RLE     Plan/Recommendations:   Pt would benefit from Home with family assist and and Home Health at discharge from facility and requires no DME at discharge.   Pt desires Home with family assist and and Home Health at discharge. Pt cooperative; agreeable to therapeutic recommendations and plan of care

## 2021-02-05 NOTE — THERAPY TREATMENT NOTE
.Subjective: Pt agreeable to therapeutic plan of care.    Objective:     Bed mobility - CGA  Sit to supine with pt sitting on clean trash can liner to reduce friction of scooting and pivoting on her bottom back to bed.  Transfers - Min assist up to rolling walker from low recliner chair.  To bs comode first.  cga off elevated comode.  Ambulation - 15 feet CGA and with rolling walker  WBAT RLE.  Step to gait pattern,  Short step length    Pain: 4 VAS  Education: Provided education on importance of mobility and skilled verbal / tactile cueing throughout intervention.     Assessment: Stefany Mancera presents with functional mobility impairments which indicate the need for skilled intervention.  Pt repeated all posterior LUIZA precautions.  Pt reluctant to amb very far this morning.  Pt performed all HEP for LUIZA 1/10 reps.    Tolerating session today without incident. Will continue to follow and progress as tolerated.     Plan/Recommendations:   Pt would benefit from Home with family assist and and Home Health at discharge from facility and requires no DME at discharge.   Pt desires Home with family assist and and Home Health at discharge. Pt cooperative; agreeable to therapeutic recommendations and plan of care.     Basic Mobility 6-click:  Rollin = Total, A lot = 2, A little = 3; 4 = None  Supine>Sit:   1 = Total, A lot = 2, A little = 3; 4 = None   Sit>Stand with arms:  1 = Total, A lot = 2, A little = 3; 4 = None  Bed>Chair:   1 = Total, A lot = 2, A little = 3; 4 = None  Ambulate in room:  1 = Total, A lot = 2, A little = 3; 4 = None  3-5 Steps with railin = Total, A lot = 2, A little = 3; 4 = None  Score: 17        Post-Tx Position: Supine with HOB Elevated, Alarms activated and Call light and personal items within reach  PPE: gloves, surgical mask, eyewear protection

## 2021-02-06 ENCOUNTER — READMISSION MANAGEMENT (OUTPATIENT)
Dept: CALL CENTER | Facility: HOSPITAL | Age: 61
End: 2021-02-06

## 2021-02-06 NOTE — OUTREACH NOTE
Prep Survey      Responses   [facility] facility patient discharged from?  [facility]   Is LACE score < 7 ?  No   Emergency Room discharge w/ pulse ox?  No   Eligibility  Readm Mgmt   Discharge diagnosis  Right hip degenerative joint disease [s/p right total hip arthroplasty]   Does the patient have one of the following disease processes/diagnoses(primary or secondary)?  Total Joint Replacement   Does the patient have Home health ordered?  No   Is there a DME ordered?  Yes   What DME was ordered?  3-in-1   Comments regarding appointments  Per AVS   Medication alerts for this patient  continue aspirin with changes   Prep survey completed?  Yes          Phuong Underwood RN

## 2021-02-08 NOTE — PROGRESS NOTES
Case Management Discharge Note      Final Note: Home with spouse. Patient denies any needs for outpatient rehab. Referral for 3N1 for [facility].         Selected Continued Care - Discharged on 2/5/2021 Admission date: 2/4/2021 - Discharge disposition: Home-Health Care Svc               Durable Medical Equipment     Service Provider Selected Services Address Phone Fax Patient Preferred    [facility]  Durable Medical Equipment [address], [place] [address] [phone] --                       Final Discharge Disposition Code: 01 - home or self-care

## 2021-02-12 ENCOUNTER — READMISSION MANAGEMENT (OUTPATIENT)
Dept: CALL CENTER | Facility: HOSPITAL | Age: 61
End: 2021-02-12

## 2021-02-12 NOTE — OUTREACH NOTE
Total Joint Week 2 Survey      Responses   [facility] patient discharged from?  [facility]   Does the patient have one of the following disease processes/diagnoses(primary or secondary)?  Total Joint Replacement   Joint surgery performed?  Hip   Week 2 attempt successful?  Yes   Call start time  0854   Call end time  0857   Has the patient been back in either the hospital or Emergency Department since discharge?  No   Discharge diagnosis  Right hip degenerative joint disease   Is the patient taking all medications as directed (includes completed medication regime)?  Yes   Has the patient kept scheduled appointments due by today?  N/A   Comments  Appt 2/15 surgical   Has home health visited the patient within 72 hours of discharge?  N/A   Home health comments  Insurance would not cover home health   Psychosocial issues?  No   Has the patient began therapy sessions (either in the home or as an out patient)?  Yes   If the patient has started attending therapy, what post op day did they begin to attend (either in home or as an out patient)?    Doing exercises on her own.     Has the patient fallen since discharge?  No   Did the patient receive a copy of their discharge instructions?  Yes   Nursing interventions  Reviewed instructions with patient   What is the patient's perception of their functional status since discharge?  Improving   Is the patient able to teach back signs and symptoms of infection?  Temp >100.4 for 24h or longer, Blisters around incision, Shortness of breath or chest pain, Changes in mobility, Severe discomfort or pain, Incisional drainage, Increased swelling or redness around incision (not associated with surgical edema)   Is the patient able to teach back how to prevent infection?  Check incision daily, Wash hands before and after touching incision, Keep incision covered if drainage, Keep incision covered if pets in house, Shower only as directed by surgeon, Monitor blood sugar if diabetic, Eat  well-balanced diet, No tub baths, hot tub or swimming, No lotion or creams   Is the patient able to teach back signs and symptoms of DVT?  Redness in calf, Area hot to touch, Shortness of breath or chest pain, Swelling in calf, Severe pain in calf   Is the patient able to teach back home safety measures?  Ability to shower, Accessibility to necessary areas in home, Modifications to reach items, Modifications with ADLs such as dressing, cooking, toileting   Did the patient implement home safety suggestions from pre-surgery classes if attended?  Yes   If the patient is a current smoker, are they able to teach back resources for cessation?  Not a smoker   Is the patient/caregiver able to teach back the hierarchy of who to call/visit for symptoms/problems? PCP, Specialist, Home health nurse, Urgent Care, ED, 911  Yes   Week 2 call completed?  Yes   Wrap up additional comments  Pain rated 2. Ambulating with walker.           Valerie Wade, RN

## 2021-03-02 ENCOUNTER — READMISSION MANAGEMENT (OUTPATIENT)
Dept: CALL CENTER | Facility: HOSPITAL | Age: 61
End: 2021-03-02

## 2021-03-02 NOTE — OUTREACH NOTE
Total Joint Month 1 Survey      Responses   [facility] patient discharged from?  [facility]   Does the patient have one of the following disease processes/diagnoses(primary or secondary)?  Total Joint Replacement   Joint surgery performed?  Hip   Month 1 attempt successful?  Yes   Call start time  1129   Call end time  1131   Has the patient been back in either the hospital or Emergency Department since discharge?  No   Discharge diagnosis  Right hip degenerative joint disease   Is patient permission given to speak with other caregiver?  Yes   List who call center can speak with     Is the patient taking all medications as directed (includes completed medication regime)?  Yes   Has the patient kept scheduled appointments due by today?  Yes   Is the patient still attending therapy sessions(either in the home or as an outpatient)?  No   Has the patient fallen since discharge?  No   Comments  Incision has healed completely since staples were DC'd. Ambulating with cane doing her own PT.    What is the patient's perception of their functional status since discharge?  Improving   Is the patient able to teach back signs and symptoms of infection?  Incisional drainage, Blisters around incision   Is the patient/caregiver able to teach back the hierarchy of who to call/visit for symptoms/problems? PCP, Specialist, Home health nurse, Urgent Care, ED, 911  Yes   Month 1 call completed?  Yes   Wrap up additional comments  .          Sierra Mendiola RN

## 2021-04-06 ENCOUNTER — READMISSION MANAGEMENT (OUTPATIENT)
Dept: CALL CENTER | Facility: HOSPITAL | Age: 61
End: 2021-04-06

## 2021-04-06 NOTE — OUTREACH NOTE
Total Joint Month 2 Survey      Responses   [facility] patient discharged from?  [facility]   Does the patient have one of the following disease processes/diagnoses(primary or secondary)?  Total Joint Replacement   Joint surgery performed?  Hip   Month 2 attempt successful?  Yes   Call start time  1218   Call end time  1219   Has the patient been back in either the hospital or Emergency Department since discharge?  No   Discharge diagnosis  Right hip degenerative joint disease   Is the patient taking all medications as directed (includes completed medication regime)?  Yes   Medication comments  Pt reports that she is without pain.    Has the patient kept scheduled appointments due by today?  Yes   Is the patient still attending therapy sessions(either in the home or as an outpatient)?  No [doing her own PT exercises. ]   Has the patient fallen since discharge?  No   Comments  Pt is doing well, walking without cane in home. She reports no issues.   What is the patient's perception of their functional status since discharge?  Improving   Is the patient able to teach back signs and symptoms of infection?  Changes in mobility, Increased swelling or redness around incision (not associated with surgical edema)   Is the patient/caregiver able to teach back the hierarchy of who to call/visit for symptoms/problems? PCP, Specialist, Home health nurse, Urgent Care, ED, 911  Yes   Month 2 Call Completed?  Yes          Sierra Mendiola RN

## 2021-05-10 ENCOUNTER — READMISSION MANAGEMENT (OUTPATIENT)
Dept: CALL CENTER | Facility: HOSPITAL | Age: 61
End: 2021-05-10

## 2021-05-10 NOTE — OUTREACH NOTE
Total Joint Month 3 Survey      Responses   [facility]   Does the patient have one of the following disease processes/diagnoses(primary or secondary)?  Total Joint Replacement   Joint surgery performed?  Hip   Month 3 attempt successful?  No   Unsuccessful attempts  Attempt 1          Erika Hare RN

## 2021-05-11 ENCOUNTER — READMISSION MANAGEMENT (OUTPATIENT)
Dept: CALL CENTER | Facility: HOSPITAL | Age: 61
End: 2021-05-11

## 2021-05-11 NOTE — OUTREACH NOTE
Total Joint Month 3 Survey      Responses   [facility] facility patient discharged from?  [facility]   Does the patient have one of the following disease processes/diagnoses(primary or secondary)?  Total Joint Replacement   Joint surgery performed?  Hip   Month 3 attempt successful?  No   Unsuccessful attempts  Attempt 2          Paola Bowers RN

## 2021-09-24 ENCOUNTER — HOSPITAL ENCOUNTER (OUTPATIENT)
Dept: GENERAL RADIOLOGY | Facility: HOSPITAL | Age: 61
Discharge: HOME | End: 2021-09-24

## 2021-09-24 ENCOUNTER — HOSPITAL ENCOUNTER (OUTPATIENT)
Dept: CARDIOLOGY | Facility: HOSPITAL | Age: 61
Discharge: HOME | End: 2021-09-24

## 2021-09-24 ENCOUNTER — LAB (OUTPATIENT)
Dept: LAB | Facility: HOSPITAL | Age: 61
End: 2021-09-24

## 2021-09-24 LAB
ABO GROUP BLD: NORMAL
ANION GAP SERPL CALCULATED.3IONS-SCNC: 13 MMOL/L (ref 5–15)
BACTERIA UR QL AUTO: ABNORMAL /HPF
BASOPHILS # BLD AUTO: 0.05 10*3/MM3 (ref 0–0.2)
BASOPHILS NFR BLD AUTO: 0.5 % (ref 0–1.5)
BILIRUB UR QL STRIP: NEGATIVE
BLD GP AB SCN SERPL QL: NEGATIVE
BUN SERPL-MCNC: 36 MG/DL (ref 8–23)
BUN/CREAT SERPL: 20.8 (ref 7–25)
CALCIUM SPEC-SCNC: 9 MG/DL (ref 8.6–10.5)
CHLORIDE SERPL-SCNC: 98 MMOL/L (ref 98–107)
CLARITY UR: ABNORMAL
CO2 SERPL-SCNC: 28 MMOL/L (ref 22–29)
COLOR UR: YELLOW
CREAT SERPL-MCNC: 1.73 MG/DL (ref 0.57–1)
DEPRECATED RDW RBC AUTO: 39.5 FL (ref 37–54)
EOSINOPHIL # BLD AUTO: 0.18 10*3/MM3 (ref 0–0.4)
EOSINOPHIL NFR BLD AUTO: 1.9 % (ref 0.3–6.2)
ERYTHROCYTE [DISTWIDTH] IN BLOOD BY AUTOMATED COUNT: 14.2 % (ref 12.3–15.4)
GFR SERPL CREATININE-BSD FRML MDRD: 30 ML/MIN/1.73
GLUCOSE SERPL-MCNC: 105 MG/DL (ref 65–99)
GLUCOSE UR STRIP-MCNC: NEGATIVE MG/DL
HCT VFR BLD AUTO: 33.9 % (ref 34–46.6)
HGB BLD-MCNC: 10.8 G/DL (ref 12–15.9)
HGB UR QL STRIP.AUTO: NEGATIVE
HYALINE CASTS UR QL AUTO: ABNORMAL /LPF
IMM GRANULOCYTES # BLD AUTO: 0.06 10*3/MM3 (ref 0–0.05)
IMM GRANULOCYTES NFR BLD AUTO: 0.6 % (ref 0–0.5)
KETONES UR QL STRIP: NEGATIVE
LEUKOCYTE ESTERASE UR QL STRIP.AUTO: ABNORMAL
LYMPHOCYTES # BLD AUTO: 2.51 10*3/MM3 (ref 0.7–3.1)
LYMPHOCYTES NFR BLD AUTO: 26.2 % (ref 19.6–45.3)
MCH RBC QN AUTO: 24.7 PG (ref 26.6–33)
MCHC RBC AUTO-ENTMCNC: 31.9 G/DL (ref 31.5–35.7)
MCV RBC AUTO: 77.4 FL (ref 79–97)
MONOCYTES # BLD AUTO: 0.56 10*3/MM3 (ref 0.1–0.9)
MONOCYTES NFR BLD AUTO: 5.8 % (ref 5–12)
MRSA DNA SPEC QL NAA+PROBE: NORMAL
NEUTROPHILS NFR BLD AUTO: 6.22 10*3/MM3 (ref 1.7–7)
NEUTROPHILS NFR BLD AUTO: 65 % (ref 42.7–76)
NITRITE UR QL STRIP: NEGATIVE
NRBC BLD AUTO-RTO: 0 /100 WBC (ref 0–0.2)
PH UR STRIP.AUTO: 6 [PH] (ref 5–8)
PLATELET # BLD AUTO: 464 10*3/MM3 (ref 140–450)
PMV BLD AUTO: 9.6 FL (ref 6–12)
POTASSIUM SERPL-SCNC: 3.4 MMOL/L (ref 3.5–5.2)
PROT UR QL STRIP: ABNORMAL
RBC # BLD AUTO: 4.38 10*6/MM3 (ref 3.77–5.28)
RBC # UR: ABNORMAL /HPF
REF LAB TEST METHOD: ABNORMAL
RH BLD: NEGATIVE
SODIUM SERPL-SCNC: 139 MMOL/L (ref 136–145)
SP GR UR STRIP: 1.02 (ref 1–1.03)
SQUAMOUS #/AREA URNS HPF: ABNORMAL /HPF
T&S EXPIRATION DATE: NORMAL
UROBILINOGEN UR QL STRIP: ABNORMAL
WBC # BLD AUTO: 9.58 10*3/MM3 (ref 3.4–10.8)
WBC UR QL AUTO: ABNORMAL /HPF

## 2021-09-24 PROCEDURE — 93010 ELECTROCARDIOGRAM REPORT: CPT | Performed by: INTERNAL MEDICINE

## 2021-09-24 PROCEDURE — 87086 URINE CULTURE/COLONY COUNT: CPT

## 2021-09-24 PROCEDURE — 85025 COMPLETE CBC W/AUTO DIFF WBC: CPT

## 2021-09-24 PROCEDURE — 81001 URINALYSIS AUTO W/SCOPE: CPT

## 2021-09-24 PROCEDURE — 86900 BLOOD TYPING SEROLOGIC ABO: CPT

## 2021-09-24 PROCEDURE — 87641 MR-STAPH DNA AMP PROBE: CPT

## 2021-09-24 PROCEDURE — 93005 ELECTROCARDIOGRAM TRACING: CPT | Performed by: ORTHOPAEDIC SURGERY

## 2021-09-24 PROCEDURE — 86850 RBC ANTIBODY SCREEN: CPT

## 2021-09-24 PROCEDURE — 86901 BLOOD TYPING SEROLOGIC RH(D): CPT

## 2021-09-24 PROCEDURE — 71046 X-RAY EXAM CHEST 2 VIEWS: CPT

## 2021-09-24 PROCEDURE — 80048 BASIC METABOLIC PNL TOTAL CA: CPT

## 2021-09-26 LAB — BACTERIA SPEC AEROBE CULT: NO GROWTH

## 2021-09-27 RX ORDER — FERROUS SULFATE TAB EC 324 MG (65 MG FE EQUIVALENT) 324 (65 FE) MG
324 TABLET DELAYED RESPONSE ORAL
COMMUNITY

## 2021-10-01 ASSESSMENT — HOOS JR
HOOS JR SCORE: 13
HOOS JR SCORE: 49.858

## 2021-10-02 ENCOUNTER — LAB (OUTPATIENT)
Dept: LAB | Facility: HOSPITAL | Age: 61
End: 2021-10-02

## 2021-10-02 LAB — SARS-COV-2 ORF1AB RESP QL NAA+PROBE: NOT DETECTED

## 2021-10-02 PROCEDURE — U0004 COV-19 TEST NON-CDC HGH THRU: HCPCS

## 2021-10-02 PROCEDURE — C9803 HOPD COVID-19 SPEC COLLECT: HCPCS

## 2021-10-04 ENCOUNTER — ANESTHESIA EVENT (OUTPATIENT)
Dept: PERIOP | Facility: HOSPITAL | Age: 61
End: 2021-10-04

## 2021-10-04 RX ORDER — OXYCODONE HCL 10 MG/1
20 TABLET, FILM COATED, EXTENDED RELEASE ORAL
Status: COMPLETED | OUTPATIENT
Start: 2021-10-05 | End: 2021-10-05

## 2021-10-04 RX ORDER — GABAPENTIN 100 MG/1
100 CAPSULE ORAL
Status: COMPLETED | OUTPATIENT
Start: 2021-10-05 | End: 2021-10-05

## 2021-10-04 NOTE — ANESTHESIA PREPROCEDURE EVALUATION
Anesthesia Evaluation     Patient summary reviewed and Nursing notes reviewed   NPO Solid Status: > 6 hours  NPO Liquid Status: > 2 hours           Airway   Mallampati: I  TM distance: >3 FB  Neck ROM: full  No difficulty expected  Dental - normal exam   (+) upper dentures and lower dentures    Pulmonary - negative pulmonary ROS and normal exam   Cardiovascular - normal exam    (+) hypertension well controlled 2 medications or greater, hyperlipidemia,       Neuro/Psych  (+) numbness, psychiatric history Depression,     GI/Hepatic/Renal/Endo    (+) obesity,   renal disease CRI, diabetes mellitus type 2 poorly controlled,     Musculoskeletal     Abdominal  - normal exam    Bowel sounds: normal.   Substance History - negative use     OB/GYN negative ob/gyn ROS         Other   arthritis,                    Anesthesia Plan    ASA 3     general   total IV anesthesia  intravenous induction     Anesthetic plan, all risks, benefits, and alternatives have been provided, discussed and informed consent has been obtained with: patient.    Plan discussed with CRNA and CAA.

## 2021-10-05 ENCOUNTER — ANESTHESIA (OUTPATIENT)
Dept: PERIOP | Facility: HOSPITAL | Age: 61
End: 2021-10-05

## 2021-10-05 ENCOUNTER — HOSPITAL ENCOUNTER (OUTPATIENT)
Facility: HOSPITAL | Age: 61
Discharge: HOME | End: 2021-10-07
Attending: ORTHOPAEDIC SURGERY | Admitting: ORTHOPAEDIC SURGERY

## 2021-10-05 ENCOUNTER — APPOINTMENT (OUTPATIENT)
Dept: GENERAL RADIOLOGY | Facility: HOSPITAL | Age: 61
End: 2021-10-05

## 2021-10-05 DIAGNOSIS — M16.11 ARTHRITIS OF RIGHT HIP: ICD-10-CM

## 2021-10-05 PROBLEM — M16.12 ARTHRITIS OF LEFT HIP: Status: ACTIVE | Noted: 2021-10-05

## 2021-10-05 LAB
ARTERIAL PATENCY WRIST A: ABNORMAL
ARTERIAL PATENCY WRIST A: ABNORMAL
ATMOSPHERIC PRESS: ABNORMAL MM[HG]
ATMOSPHERIC PRESS: ABNORMAL MM[HG]
BASE EXCESS BLDA CALC-SCNC: -0.6 MMOL/L (ref 0–3)
BASE EXCESS BLDA CALC-SCNC: -4.8 MMOL/L (ref 0–3)
BDY SITE: ABNORMAL
BDY SITE: ABNORMAL
CA-I BLDA-SCNC: 1.2 MMOL/L (ref 1.15–1.33)
CO2 BLDA-SCNC: 26.4 MMOL/L (ref 22–29)
CO2 BLDA-SCNC: 32.9 MMOL/L (ref 22–29)
D-LACTATE SERPL-SCNC: 1 MMOL/L (ref 0.5–2)
GLUCOSE BLDC GLUCOMTR-MCNC: 188 MG/DL (ref 70–105)
GLUCOSE BLDC GLUCOMTR-MCNC: 189 MG/DL (ref 70–105)
GLUCOSE BLDC GLUCOMTR-MCNC: 244 MG/DL (ref 70–105)
GLUCOSE BLDC GLUCOMTR-MCNC: 257 MG/DL (ref 74–100)
GLUCOSE BLDC GLUCOMTR-MCNC: 257 MG/DL (ref 74–100)
HCO3 BLDA-SCNC: 25.1 MMOL/L (ref 21–28)
HCO3 BLDA-SCNC: 29.3 MMOL/L (ref 21–28)
HCT VFR BLDA CALC: 38 % (ref 38–51)
HEMODILUTION: NO
HEMODILUTION: NO
HGB BLDA-MCNC: 13 G/DL (ref 12–17)
INHALED O2 CONCENTRATION: 100 %
INHALED O2 CONCENTRATION: 100 %
MODALITY: ABNORMAL
MODALITY: ABNORMAL
PCO2 BLDA: 117.2 MM HG (ref 35–48)
PCO2 BLDA: 44.6 MM HG (ref 35–48)
PEEP RESPIRATORY: 5 CM[H2O]
PH BLDA: 7 PH UNITS (ref 7.35–7.45)
PH BLDA: 7.36 PH UNITS (ref 7.35–7.45)
PO2 BLDA: 300.5 MM HG (ref 83–108)
PO2 BLDA: 99.3 MM HG (ref 83–108)
POTASSIUM BLDA-SCNC: 3.7 MMOL/L (ref 3.5–4.5)
POTASSIUM SERPL-SCNC: 3.6 MMOL/L (ref 3.5–5.2)
QT INTERVAL: 419 MS
RESPIRATORY RATE: 18
SAO2 % BLDCOA: 91.9 % (ref 94–98)
SAO2 % BLDCOA: 99.9 % (ref 94–98)
SODIUM BLD-SCNC: 140 MMOL/L (ref 138–146)
VENTILATOR MODE: ABNORMAL
VT ON VENT VENT: 550 ML

## 2021-10-05 PROCEDURE — 25010000002 HYDRALAZINE PER 20 MG

## 2021-10-05 PROCEDURE — 25010000002 FENTANYL CITRATE (PF) 100 MCG/2ML SOLUTION

## 2021-10-05 PROCEDURE — 82330 ASSAY OF CALCIUM: CPT

## 2021-10-05 PROCEDURE — 99214 OFFICE O/P EST MOD 30 MIN: CPT | Performed by: HOSPITALIST

## 2021-10-05 PROCEDURE — 25010000002 PROPOFOL 10 MG/ML EMULSION

## 2021-10-05 PROCEDURE — 25010000002 ROPIVACAINE PER 1 MG: Performed by: ORTHOPAEDIC SURGERY

## 2021-10-05 PROCEDURE — C1776 JOINT DEVICE (IMPLANTABLE): HCPCS | Performed by: ORTHOPAEDIC SURGERY

## 2021-10-05 PROCEDURE — 80051 ELECTROLYTE PANEL: CPT

## 2021-10-05 PROCEDURE — 25010000002 CEFAZOLIN PER 500 MG: Performed by: ORTHOPAEDIC SURGERY

## 2021-10-05 PROCEDURE — 63710000001 INSULIN LISPRO (HUMAN) PER 5 UNITS

## 2021-10-05 PROCEDURE — 82962 GLUCOSE BLOOD TEST: CPT

## 2021-10-05 PROCEDURE — 36600 WITHDRAWAL OF ARTERIAL BLOOD: CPT

## 2021-10-05 PROCEDURE — 83605 ASSAY OF LACTIC ACID: CPT

## 2021-10-05 PROCEDURE — G0378 HOSPITAL OBSERVATION PER HR: HCPCS

## 2021-10-05 PROCEDURE — 94660 CPAP INITIATION&MGMT: CPT

## 2021-10-05 PROCEDURE — 73502 X-RAY EXAM HIP UNI 2-3 VIEWS: CPT

## 2021-10-05 PROCEDURE — 25010000002 LORAZEPAM PER 2 MG: Performed by: ANESTHESIOLOGY

## 2021-10-05 PROCEDURE — 25010000002 ONDANSETRON PER 1 MG

## 2021-10-05 PROCEDURE — 25010000002 MIDAZOLAM PER 1 MG

## 2021-10-05 PROCEDURE — 25010000002 HYDROMORPHONE PER 4 MG

## 2021-10-05 PROCEDURE — 25010000002 EPINEPHRINE PER 0.1 MG: Performed by: ORTHOPAEDIC SURGERY

## 2021-10-05 PROCEDURE — 94799 UNLISTED PULMONARY SVC/PX: CPT

## 2021-10-05 PROCEDURE — 27130 TOTAL HIP ARTHROPLASTY: CPT | Performed by: NURSE PRACTITIONER

## 2021-10-05 PROCEDURE — 25010000002 NALOXONE PER 1 MG: Performed by: ANESTHESIOLOGY

## 2021-10-05 PROCEDURE — 85018 HEMOGLOBIN: CPT

## 2021-10-05 PROCEDURE — 94002 VENT MGMT INPAT INIT DAY: CPT

## 2021-10-05 PROCEDURE — 25010000002 DEXAMETHASONE PER 1 MG

## 2021-10-05 PROCEDURE — 82803 BLOOD GASES ANY COMBINATION: CPT

## 2021-10-05 PROCEDURE — 84132 ASSAY OF SERUM POTASSIUM: CPT | Performed by: ORTHOPAEDIC SURGERY

## 2021-10-05 DEVICE — SCRW HEX LP TRIDENT2 6.5X20MM: Type: IMPLANTABLE DEVICE | Site: HIP | Status: FUNCTIONAL

## 2021-10-05 DEVICE — IMPLANTABLE DEVICE: Type: IMPLANTABLE DEVICE | Site: HIP | Status: FUNCTIONAL

## 2021-10-05 DEVICE — LINER MDM CMTLS COCR 42MM: Type: IMPLANTABLE DEVICE | Site: HIP | Status: FUNCTIONAL

## 2021-10-05 DEVICE — HD TPR FEM/HIP V40 LFIT COCR 28MM MIN4: Type: IMPLANTABLE DEVICE | Site: HIP | Status: FUNCTIONAL

## 2021-10-05 DEVICE — SCRW HEX LP TRIDENT2 6.5X30MM: Type: IMPLANTABLE DEVICE | Site: HIP | Status: FUNCTIONAL

## 2021-10-05 DEVICE — STEM HIP ACCOLADE2 V40 132D 35X111MM SZ6: Type: IMPLANTABLE DEVICE | Site: HIP | Status: FUNCTIONAL

## 2021-10-05 DEVICE — SHLL TRIDENT2 TRITANIUM C/HL 52MM: Type: IMPLANTABLE DEVICE | Site: HIP | Status: FUNCTIONAL

## 2021-10-05 DEVICE — INSRT HIP RESTOR ADM X3 28/48MM SZ42E: Type: IMPLANTABLE DEVICE | Site: HIP | Status: FUNCTIONAL

## 2021-10-05 DEVICE — CAP TOTL HIP MOBL BEAR 5 PC: Type: IMPLANTABLE DEVICE | Site: HIP | Status: FUNCTIONAL

## 2021-10-05 RX ORDER — MIDAZOLAM HYDROCHLORIDE 1 MG/ML
1 INJECTION INTRAMUSCULAR; INTRAVENOUS ONCE
Status: COMPLETED | OUTPATIENT
Start: 2021-10-05 | End: 2021-10-05

## 2021-10-05 RX ORDER — HYDRALAZINE HYDROCHLORIDE 20 MG/ML
INJECTION INTRAMUSCULAR; INTRAVENOUS AS NEEDED
Status: DISCONTINUED | OUTPATIENT
Start: 2021-10-05 | End: 2021-10-05 | Stop reason: SURG

## 2021-10-05 RX ORDER — INSULIN LISPRO 100 [IU]/ML
5 INJECTION, SOLUTION INTRAVENOUS; SUBCUTANEOUS ONCE
Status: COMPLETED | OUTPATIENT
Start: 2021-10-05 | End: 2021-10-05

## 2021-10-05 RX ORDER — HYDROCHLOROTHIAZIDE 25 MG/1
25 TABLET ORAL DAILY
Status: DISCONTINUED | OUTPATIENT
Start: 2021-10-06 | End: 2021-10-07 | Stop reason: HOSPADM

## 2021-10-05 RX ORDER — NALOXONE HCL 0.4 MG/ML
0.4 VIAL (ML) INJECTION AS NEEDED
Status: DISCONTINUED | OUTPATIENT
Start: 2021-10-05 | End: 2021-10-07 | Stop reason: HOSPADM

## 2021-10-05 RX ORDER — ONDANSETRON 4 MG/1
4 TABLET, FILM COATED ORAL EVERY 6 HOURS PRN
Status: DISCONTINUED | OUTPATIENT
Start: 2021-10-05 | End: 2021-10-07 | Stop reason: HOSPADM

## 2021-10-05 RX ORDER — LIDOCAINE HYDROCHLORIDE 20 MG/ML
INJECTION, SOLUTION EPIDURAL; INFILTRATION; INTRACAUDAL; PERINEURAL AS NEEDED
Status: DISCONTINUED | OUTPATIENT
Start: 2021-10-05 | End: 2021-10-05 | Stop reason: SURG

## 2021-10-05 RX ORDER — FENTANYL CITRATE 50 UG/ML
50 INJECTION, SOLUTION INTRAMUSCULAR; INTRAVENOUS
Status: DISCONTINUED | OUTPATIENT
Start: 2021-10-05 | End: 2021-10-05 | Stop reason: HOSPADM

## 2021-10-05 RX ORDER — DEXAMETHASONE SODIUM PHOSPHATE 4 MG/ML
INJECTION, SOLUTION INTRA-ARTICULAR; INTRALESIONAL; INTRAMUSCULAR; INTRAVENOUS; SOFT TISSUE AS NEEDED
Status: DISCONTINUED | OUTPATIENT
Start: 2021-10-05 | End: 2021-10-05 | Stop reason: SURG

## 2021-10-05 RX ORDER — VERAPAMIL HYDROCHLORIDE 240 MG/1
240 TABLET, FILM COATED, EXTENDED RELEASE ORAL NIGHTLY
Status: DISCONTINUED | OUTPATIENT
Start: 2021-10-05 | End: 2021-10-07 | Stop reason: HOSPADM

## 2021-10-05 RX ORDER — OXYCODONE HYDROCHLORIDE 5 MG/1
7.5 TABLET ORAL ONCE AS NEEDED
Status: DISCONTINUED | OUTPATIENT
Start: 2021-10-05 | End: 2021-10-05 | Stop reason: HOSPADM

## 2021-10-05 RX ORDER — ONDANSETRON 2 MG/ML
4 INJECTION INTRAMUSCULAR; INTRAVENOUS EVERY 6 HOURS PRN
Status: DISCONTINUED | OUTPATIENT
Start: 2021-10-05 | End: 2021-10-07 | Stop reason: HOSPADM

## 2021-10-05 RX ORDER — FENTANYL CITRATE 50 UG/ML
25 INJECTION, SOLUTION INTRAMUSCULAR; INTRAVENOUS
Status: DISCONTINUED | OUTPATIENT
Start: 2021-10-05 | End: 2021-10-05 | Stop reason: HOSPADM

## 2021-10-05 RX ORDER — NALOXONE HCL 0.4 MG/ML
0.4 VIAL (ML) INJECTION
Status: DISCONTINUED | OUTPATIENT
Start: 2021-10-05 | End: 2021-10-07 | Stop reason: HOSPADM

## 2021-10-05 RX ORDER — MORPHINE SULFATE 4 MG/ML
4 INJECTION, SOLUTION INTRAMUSCULAR; INTRAVENOUS
Status: DISCONTINUED | OUTPATIENT
Start: 2021-10-05 | End: 2021-10-07 | Stop reason: HOSPADM

## 2021-10-05 RX ORDER — ALBUTEROL SULFATE 2.5 MG/3ML
2.5 SOLUTION RESPIRATORY (INHALATION) ONCE AS NEEDED
Status: DISCONTINUED | OUTPATIENT
Start: 2021-10-05 | End: 2021-10-05 | Stop reason: HOSPADM

## 2021-10-05 RX ORDER — OXYCODONE HYDROCHLORIDE 5 MG/1
5 TABLET ORAL EVERY 4 HOURS PRN
Status: DISCONTINUED | OUTPATIENT
Start: 2021-10-05 | End: 2021-10-07 | Stop reason: HOSPADM

## 2021-10-05 RX ORDER — HYDROMORPHONE HCL 110MG/55ML
1 PATIENT CONTROLLED ANALGESIA SYRINGE INTRAVENOUS
Status: DISCONTINUED | OUTPATIENT
Start: 2021-10-05 | End: 2021-10-05 | Stop reason: HOSPADM

## 2021-10-05 RX ORDER — ROCURONIUM BROMIDE 10 MG/ML
INJECTION, SOLUTION INTRAVENOUS AS NEEDED
Status: DISCONTINUED | OUTPATIENT
Start: 2021-10-05 | End: 2021-10-05 | Stop reason: SURG

## 2021-10-05 RX ORDER — PAROXETINE HYDROCHLORIDE 20 MG/1
40 TABLET, FILM COATED ORAL EVERY MORNING
Status: DISCONTINUED | OUTPATIENT
Start: 2021-10-06 | End: 2021-10-07 | Stop reason: HOSPADM

## 2021-10-05 RX ORDER — CHOLECALCIFEROL (VITAMIN D3) 125 MCG
5 CAPSULE ORAL NIGHTLY PRN
Status: DISCONTINUED | OUTPATIENT
Start: 2021-10-05 | End: 2021-10-07 | Stop reason: HOSPADM

## 2021-10-05 RX ORDER — HYDRALAZINE HYDROCHLORIDE 25 MG/1
25 TABLET, FILM COATED ORAL 3 TIMES DAILY
Status: DISCONTINUED | OUTPATIENT
Start: 2021-10-05 | End: 2021-10-07 | Stop reason: HOSPADM

## 2021-10-05 RX ORDER — SODIUM CHLORIDE 0.9 % (FLUSH) 0.9 %
3 SYRINGE (ML) INJECTION EVERY 12 HOURS SCHEDULED
Status: DISCONTINUED | OUTPATIENT
Start: 2021-10-05 | End: 2021-10-07 | Stop reason: HOSPADM

## 2021-10-05 RX ORDER — TRANEXAMIC ACID 10 MG/ML
1000 INJECTION, SOLUTION INTRAVENOUS ONCE
Status: COMPLETED | OUTPATIENT
Start: 2021-10-05 | End: 2021-10-05

## 2021-10-05 RX ORDER — LIDOCAINE HYDROCHLORIDE 10 MG/ML
0.5 INJECTION, SOLUTION INFILTRATION; PERINEURAL ONCE AS NEEDED
Status: DISCONTINUED | OUTPATIENT
Start: 2021-10-05 | End: 2021-10-05 | Stop reason: HOSPADM

## 2021-10-05 RX ORDER — SODIUM CHLORIDE 9 MG/ML
INJECTION, SOLUTION INTRAVENOUS CONTINUOUS PRN
Status: DISCONTINUED | OUTPATIENT
Start: 2021-10-05 | End: 2021-10-05 | Stop reason: SURG

## 2021-10-05 RX ORDER — CALCITRIOL 0.25 UG/1
0.25 CAPSULE, LIQUID FILLED ORAL DAILY
Status: DISCONTINUED | OUTPATIENT
Start: 2021-10-06 | End: 2021-10-07 | Stop reason: HOSPADM

## 2021-10-05 RX ORDER — OXYCODONE HYDROCHLORIDE 5 MG/1
10 TABLET ORAL EVERY 4 HOURS PRN
Status: DISCONTINUED | OUTPATIENT
Start: 2021-10-05 | End: 2021-10-05 | Stop reason: HOSPADM

## 2021-10-05 RX ORDER — ACETAMINOPHEN 325 MG/1
650 TABLET ORAL ONCE AS NEEDED
Status: DISCONTINUED | OUTPATIENT
Start: 2021-10-05 | End: 2021-10-05 | Stop reason: HOSPADM

## 2021-10-05 RX ORDER — GABAPENTIN 300 MG/1
300 CAPSULE ORAL NIGHTLY PRN
Status: DISCONTINUED | OUTPATIENT
Start: 2021-10-05 | End: 2021-10-07 | Stop reason: HOSPADM

## 2021-10-05 RX ORDER — OXYCODONE HYDROCHLORIDE 5 MG/1
10 TABLET ORAL EVERY 4 HOURS PRN
Status: DISCONTINUED | OUTPATIENT
Start: 2021-10-05 | End: 2021-10-07 | Stop reason: HOSPADM

## 2021-10-05 RX ORDER — SODIUM CHLORIDE 9 MG/ML
1000 INJECTION, SOLUTION INTRAVENOUS ONCE
Status: COMPLETED | OUTPATIENT
Start: 2021-10-05 | End: 2021-10-05

## 2021-10-05 RX ORDER — POLYETHYLENE GLYCOL 3350 17 G/17G
17 POWDER, FOR SOLUTION ORAL DAILY
Status: DISCONTINUED | OUTPATIENT
Start: 2021-10-05 | End: 2021-10-07 | Stop reason: HOSPADM

## 2021-10-05 RX ORDER — HYDROMORPHONE HCL 110MG/55ML
0.5 PATIENT CONTROLLED ANALGESIA SYRINGE INTRAVENOUS
Status: DISCONTINUED | OUTPATIENT
Start: 2021-10-05 | End: 2021-10-05 | Stop reason: HOSPADM

## 2021-10-05 RX ORDER — HYDRALAZINE HYDROCHLORIDE 20 MG/ML
5 INJECTION INTRAMUSCULAR; INTRAVENOUS
Status: DISCONTINUED | OUTPATIENT
Start: 2021-10-05 | End: 2021-10-05 | Stop reason: HOSPADM

## 2021-10-05 RX ORDER — SODIUM CHLORIDE 0.9 % (FLUSH) 0.9 %
10 SYRINGE (ML) INJECTION AS NEEDED
Status: DISCONTINUED | OUTPATIENT
Start: 2021-10-05 | End: 2021-10-07 | Stop reason: HOSPADM

## 2021-10-05 RX ORDER — ONDANSETRON 2 MG/ML
4 INJECTION INTRAMUSCULAR; INTRAVENOUS ONCE AS NEEDED
Status: DISCONTINUED | OUTPATIENT
Start: 2021-10-05 | End: 2021-10-05 | Stop reason: HOSPADM

## 2021-10-05 RX ORDER — ACETAMINOPHEN 325 MG/1
325 TABLET ORAL EVERY 4 HOURS PRN
Status: DISCONTINUED | OUTPATIENT
Start: 2021-10-05 | End: 2021-10-07 | Stop reason: HOSPADM

## 2021-10-05 RX ORDER — HYDROMORPHONE HCL 110MG/55ML
0.25 PATIENT CONTROLLED ANALGESIA SYRINGE INTRAVENOUS
Status: DISCONTINUED | OUTPATIENT
Start: 2021-10-05 | End: 2021-10-05 | Stop reason: HOSPADM

## 2021-10-05 RX ORDER — GLIPIZIDE 5 MG/1
5 TABLET ORAL
Status: DISCONTINUED | OUTPATIENT
Start: 2021-10-06 | End: 2021-10-07 | Stop reason: HOSPADM

## 2021-10-05 RX ORDER — LABETALOL HYDROCHLORIDE 5 MG/ML
5 INJECTION, SOLUTION INTRAVENOUS
Status: DISCONTINUED | OUTPATIENT
Start: 2021-10-05 | End: 2021-10-05 | Stop reason: HOSPADM

## 2021-10-05 RX ORDER — NALOXONE HCL 0.4 MG/ML
0.4 VIAL (ML) INJECTION AS NEEDED
Status: DISCONTINUED | OUTPATIENT
Start: 2021-10-05 | End: 2021-10-05 | Stop reason: HOSPADM

## 2021-10-05 RX ORDER — VENLAFAXINE 75 MG/1
37.5 TABLET ORAL EVERY MORNING
Status: DISCONTINUED | OUTPATIENT
Start: 2021-10-06 | End: 2021-10-07 | Stop reason: HOSPADM

## 2021-10-05 RX ORDER — FENTANYL CITRATE 50 UG/ML
INJECTION, SOLUTION INTRAMUSCULAR; INTRAVENOUS AS NEEDED
Status: DISCONTINUED | OUTPATIENT
Start: 2021-10-05 | End: 2021-10-05 | Stop reason: SURG

## 2021-10-05 RX ORDER — ATORVASTATIN CALCIUM 10 MG/1
10 TABLET, FILM COATED ORAL DAILY
Status: DISCONTINUED | OUTPATIENT
Start: 2021-10-05 | End: 2021-10-07 | Stop reason: HOSPADM

## 2021-10-05 RX ORDER — ONDANSETRON 2 MG/ML
INJECTION INTRAMUSCULAR; INTRAVENOUS AS NEEDED
Status: DISCONTINUED | OUTPATIENT
Start: 2021-10-05 | End: 2021-10-05 | Stop reason: SURG

## 2021-10-05 RX ORDER — PROPOFOL 10 MG/ML
VIAL (ML) INTRAVENOUS AS NEEDED
Status: DISCONTINUED | OUTPATIENT
Start: 2021-10-05 | End: 2021-10-05 | Stop reason: SURG

## 2021-10-05 RX ORDER — ACETAMINOPHEN 650 MG/1
650 SUPPOSITORY RECTAL ONCE AS NEEDED
Status: DISCONTINUED | OUTPATIENT
Start: 2021-10-05 | End: 2021-10-05 | Stop reason: HOSPADM

## 2021-10-05 RX ORDER — LORAZEPAM 2 MG/ML
2 INJECTION INTRAMUSCULAR
Status: COMPLETED | OUTPATIENT
Start: 2021-10-05 | End: 2021-10-05

## 2021-10-05 RX ORDER — PIOGLITAZONEHYDROCHLORIDE 30 MG/1
30 TABLET ORAL DAILY
Status: DISCONTINUED | OUTPATIENT
Start: 2021-10-05 | End: 2021-10-07 | Stop reason: HOSPADM

## 2021-10-05 RX ORDER — HYDROMORPHONE HCL 110MG/55ML
PATIENT CONTROLLED ANALGESIA SYRINGE INTRAVENOUS AS NEEDED
Status: DISCONTINUED | OUTPATIENT
Start: 2021-10-05 | End: 2021-10-05 | Stop reason: SURG

## 2021-10-05 RX ORDER — PROMETHAZINE HYDROCHLORIDE 12.5 MG/1
12.5 TABLET ORAL EVERY 6 HOURS PRN
Status: DISCONTINUED | OUTPATIENT
Start: 2021-10-05 | End: 2021-10-07 | Stop reason: HOSPADM

## 2021-10-05 RX ORDER — FLUMAZENIL 0.1 MG/ML
0.1 INJECTION INTRAVENOUS AS NEEDED
Status: DISCONTINUED | OUTPATIENT
Start: 2021-10-05 | End: 2021-10-07 | Stop reason: HOSPADM

## 2021-10-05 RX ORDER — SODIUM BICARBONATE 650 MG/1
325 TABLET ORAL 3 TIMES DAILY
Status: DISCONTINUED | OUTPATIENT
Start: 2021-10-05 | End: 2021-10-07 | Stop reason: HOSPADM

## 2021-10-05 RX ORDER — SODIUM CHLORIDE 0.9 % (FLUSH) 0.9 %
10 SYRINGE (ML) INJECTION AS NEEDED
Status: DISCONTINUED | OUTPATIENT
Start: 2021-10-05 | End: 2021-10-05 | Stop reason: HOSPADM

## 2021-10-05 RX ORDER — SODIUM CHLORIDE 9 MG/ML
125 INJECTION, SOLUTION INTRAVENOUS CONTINUOUS
Status: DISCONTINUED | OUTPATIENT
Start: 2021-10-05 | End: 2021-10-07 | Stop reason: HOSPADM

## 2021-10-05 RX ORDER — LABETALOL HYDROCHLORIDE 5 MG/ML
INJECTION, SOLUTION INTRAVENOUS AS NEEDED
Status: DISCONTINUED | OUTPATIENT
Start: 2021-10-05 | End: 2021-10-05 | Stop reason: SURG

## 2021-10-05 RX ORDER — FERROUS SULFATE TAB EC 324 MG (65 MG FE EQUIVALENT) 324 (65 FE) MG
324 TABLET DELAYED RESPONSE ORAL
Status: DISCONTINUED | OUTPATIENT
Start: 2021-10-06 | End: 2021-10-07 | Stop reason: HOSPADM

## 2021-10-05 RX ADMIN — LABETALOL 20 MG/4 ML (5 MG/ML) INTRAVENOUS SYRINGE 10 MG: at 07:47

## 2021-10-05 RX ADMIN — MIDAZOLAM 1 MG: 1 INJECTION INTRAMUSCULAR; INTRAVENOUS at 12:53

## 2021-10-05 RX ADMIN — PROPOFOL 150 MCG/KG/MIN: 10 INJECTION, EMULSION INTRAVENOUS at 08:22

## 2021-10-05 RX ADMIN — FLUMAZENIL 0.1 MG: 0.1 INJECTION, SOLUTION INTRAVENOUS at 11:37

## 2021-10-05 RX ADMIN — CEFAZOLIN SODIUM 2 G: 1 INJECTION, POWDER, FOR SOLUTION INTRAMUSCULAR; INTRAVENOUS at 07:39

## 2021-10-05 RX ADMIN — LABETALOL 20 MG/4 ML (5 MG/ML) INTRAVENOUS SYRINGE 5 MG: at 12:16

## 2021-10-05 RX ADMIN — LIDOCAINE HYDROCHLORIDE 100 MG: 20 INJECTION, SOLUTION EPIDURAL; INFILTRATION; INTRACAUDAL; PERINEURAL at 07:34

## 2021-10-05 RX ADMIN — ROCURONIUM BROMIDE 20 MG: 10 INJECTION INTRAVENOUS at 08:35

## 2021-10-05 RX ADMIN — SODIUM CHLORIDE 125 ML/HR: 9 INJECTION, SOLUTION INTRAVENOUS at 18:06

## 2021-10-05 RX ADMIN — ATORVASTATIN CALCIUM 10 MG: 10 TABLET, FILM COATED ORAL at 21:22

## 2021-10-05 RX ADMIN — PROPOFOL 200 MG: 10 INJECTION, EMULSION INTRAVENOUS at 07:34

## 2021-10-05 RX ADMIN — ROCURONIUM BROMIDE 50 MG: 10 INJECTION INTRAVENOUS at 07:34

## 2021-10-05 RX ADMIN — HYDRALAZINE HYDROCHLORIDE 25 MG: 25 TABLET, FILM COATED ORAL at 21:21

## 2021-10-05 RX ADMIN — INSULIN LISPRO 5 UNITS: 100 INJECTION, SOLUTION INTRAVENOUS; SUBCUTANEOUS at 09:43

## 2021-10-05 RX ADMIN — HYDROMORPHONE HYDROCHLORIDE 1 MG: 2 INJECTION INTRAMUSCULAR; INTRAVENOUS; SUBCUTANEOUS at 09:27

## 2021-10-05 RX ADMIN — LORAZEPAM 2 MG: 2 INJECTION INTRAMUSCULAR; INTRAVENOUS at 07:02

## 2021-10-05 RX ADMIN — TRANEXAMIC ACID 1000 MG: 10 INJECTION, SOLUTION INTRAVENOUS at 07:42

## 2021-10-05 RX ADMIN — Medication 3 ML: at 21:35

## 2021-10-05 RX ADMIN — FENTANYL CITRATE 100 MCG: 50 INJECTION, SOLUTION INTRAMUSCULAR; INTRAVENOUS at 07:32

## 2021-10-05 RX ADMIN — SODIUM CHLORIDE 1000 ML: 9 INJECTION, SOLUTION INTRAVENOUS at 06:41

## 2021-10-05 RX ADMIN — HYDROMORPHONE HYDROCHLORIDE 0.5 MG: 2 INJECTION INTRAMUSCULAR; INTRAVENOUS; SUBCUTANEOUS at 08:11

## 2021-10-05 RX ADMIN — RIVAROXABAN 10 MG: 10 TABLET, FILM COATED ORAL at 21:21

## 2021-10-05 RX ADMIN — OXYCODONE HYDROCHLORIDE 20 MG: 10 TABLET, FILM COATED, EXTENDED RELEASE ORAL at 06:42

## 2021-10-05 RX ADMIN — PIOGLITAZONE 30 MG: 30 TABLET ORAL at 21:22

## 2021-10-05 RX ADMIN — NALOXONE HYDROCHLORIDE 0.4 MG: 0.4 INJECTION, SOLUTION INTRAMUSCULAR; INTRAVENOUS; SUBCUTANEOUS at 10:46

## 2021-10-05 RX ADMIN — VERAPAMIL HYDROCHLORIDE 240 MG: 240 TABLET, FILM COATED, EXTENDED RELEASE ORAL at 21:22

## 2021-10-05 RX ADMIN — FLUMAZENIL 0.1 MG: 0.1 INJECTION, SOLUTION INTRAVENOUS at 11:43

## 2021-10-05 RX ADMIN — PROPOFOL 175 MCG/KG/MIN: 10 INJECTION, EMULSION INTRAVENOUS at 07:37

## 2021-10-05 RX ADMIN — GABAPENTIN 100 MG: 100 CAPSULE ORAL at 06:42

## 2021-10-05 RX ADMIN — PROPOFOL 150 MCG/KG/MIN: 10 INJECTION, EMULSION INTRAVENOUS at 07:51

## 2021-10-05 RX ADMIN — HYDRALAZINE HYDROCHLORIDE 5 MG: 20 INJECTION INTRAMUSCULAR; INTRAVENOUS at 07:48

## 2021-10-05 RX ADMIN — POLYETHYLENE GLYCOL 3350 17 G: 17 POWDER, FOR SOLUTION ORAL at 21:21

## 2021-10-05 RX ADMIN — ONDANSETRON 4 MG: 2 INJECTION INTRAMUSCULAR; INTRAVENOUS at 09:02

## 2021-10-05 RX ADMIN — DEXAMETHASONE SODIUM PHOSPHATE 4 MG: 4 INJECTION, SOLUTION INTRAMUSCULAR; INTRAVENOUS at 08:03

## 2021-10-05 RX ADMIN — SODIUM BICARBONATE 650 MG TABLET 325 MG: at 21:22

## 2021-10-05 RX ADMIN — HYDRALAZINE HYDROCHLORIDE 5 MG: 20 INJECTION INTRAMUSCULAR; INTRAVENOUS at 08:11

## 2021-10-05 RX ADMIN — SODIUM CHLORIDE: 0.9 INJECTION, SOLUTION INTRAVENOUS at 07:25

## 2021-10-05 RX ADMIN — CEFAZOLIN SODIUM 2 G: 10 INJECTION, POWDER, FOR SOLUTION INTRAVENOUS at 21:20

## 2021-10-05 NOTE — ANESTHESIA POSTPROCEDURE EVALUATION
Patient: Stefany Mancera    Procedure Summary     Date: 10/05/21 Room / Location: [facility] OR 11 / [facility] MAIN OR    Anesthesia Start: 0725 Anesthesia Stop: 0931    Procedure: TOTAL HIP ARTHROPLASTY (Left Hip) Diagnosis:       Osteoarthritis of left hip      (Osteoarthritis of left hip [M16.12])    Surgeons: Ángel Madrigal MD Provider: Rod Hui DO    Anesthesia Type: general ASA Status: 3          Anesthesia Type: general    Vitals  Vitals Value Taken Time   /78 10/05/21 1703   Temp 97.7 °F (36.5 °C) 10/05/21 1700   Pulse 83 10/05/21 1704   Resp 11 10/05/21 1700   SpO2 100 % 10/05/21 1704   Vitals shown include unvalidated device data.        Post Anesthesia Care and Evaluation    Patient location during evaluation: PACU  Patient participation: complete - patient participated  Level of consciousness: awake  Pain scale: See nurse's notes for pain score.  Pain management: adequate  Airway patency: patent  Anesthetic complications: No anesthetic complications  PONV Status: none  Cardiovascular status: acceptable  Respiratory status: acceptable  Hydration status: acceptable    Comments: Patient seen and examined postoperatively; vital signs stable; SpO2 greater than or equal to 90%; cardiopulmonary status stable; nausea/vomiting adequately controlled; pain adequately controlled; no apparent anesthesia complications; patient discharged from anesthesia care when discharge criteria were met

## 2021-10-05 NOTE — PLAN OF CARE
Pt currently still in PACU due to respiratory status.  PT will follow up at later date.    Miley Raygoza PT, DPT, GCS

## 2021-10-05 NOTE — ANESTHESIA PROCEDURE NOTES
Airway  Urgency: elective    Date/Time: 10/5/2021 7:36 AM  Airway not difficult    General Information and Staff    Patient location during procedure: OR  Anesthesiologist: Rod Hui DO  CRNA: Anastacia Garber AA    Indications and Patient Condition  Indications for airway management: airway protection    Preoxygenated: yes  Mask difficulty assessment: 2 - vent by mask + OA or adjuvant +/- NMBA    Final Airway Details  Final airway type: endotracheal airway      Successful airway: ETT  Cuffed: yes   Successful intubation technique: direct laryngoscopy  Facilitating devices/methods: intubating stylet  Endotracheal tube insertion site: oral  Blade: Boaz  Blade size: 3  ETT size (mm): 7.0  Cormack-Lehane Classification: grade I - full view of glottis  Placement verified by: capnometry   Measured from: teeth  ETT/EBT  to teeth (cm): 21  Number of attempts at approach: 1  Assessment: lips, teeth, and gum same as pre-op and atraumatic intubation    Additional Comments  Intubation performed by ALMA ROSA Armijo.

## 2021-10-05 NOTE — OP NOTE
TOTAL HIP ARTHROPLASTY  Procedure Report    Patient Name:  Stefany Mancera  YOB: 1960    Date of Surgery:  10/5/2021         Pre-op Diagnosis: Left hip degenerative joint disease    Post-op diagnosis: Same    Indication: 61-year-old female painful left hip refractory conservative care.  She was have this placed for pain relief      Procedure/CPT® Codes:      Procedure(s):  TOTAL HIP ARTHROPLASTY, left    Staff:  Surgeon(s):  Ángel Madrigal MD    Assistant: Ilda Stubbs APRN   assisted with hemostasis retraction and wound closure and was essential to the case.    Anesthesia: General    Estimated Blood Loss: 400 mL    Implants:    Implant Name Type Inv. Item Serial No.  Lot No. LRB No. Used Action   SHLL TRIDENT2 TRITANIUM C/HL 52MM - [id number] Implant SHLL TRIDENT2 TRITANIUM C/HL 52MM  [employer] [id number] Left 1 Implanted   SCRW HEX LP TRIDENT2 6.5X30MM - [id number] Implant SCRW HEX LP TRIDENT2 6.5X30MM  [employer] [id number] Left 1 Implanted   SCRW HEX LP TRIDENT2 6.5X20MM - [id number] Implant SCRW HEX LP TRIDENT2 6.5X20MM  [employer] [id number] Left 1 Implanted   LINER MDM CMTLS COCR 42MM - [id number] Implant LINER MDM CMTLS COCR 42MM  [employer] [id number] Left 1 Implanted   SCRW HEX LP TRIDENT2 6.5X15MM - [id number] Implant SCRW HEX LP TRIDENT2 6.5X15MM  [employer] [id number] Left 1 Implanted   STEM HIP ACCOLADE2 V40 132D [id number] SZ6 - [id number] Implant STEM HIP ACCOLADE2 V40 132D [id number]  [employer] [id number] Left 1 Implanted   HD TPR FEM/HIP V40 LFIT COCR 28MM MIN4 - [id number] Implant HD TPR FEM/HIP V40 LFIT COCR 28MM MIN4  [employer] [id number] Left 1 Implanted   INSRT HIP RESTOR ADM/MDM X3 28/48MM [id number] - [id number] Implant INSRT HIP RESTOR ADM/MDM X3 28/48MM [id number]  [employer] [id number] Left 1 Implanted       Specimen:          None        Findings: Severe DJD    Complications: None    Description of Procedure:   Patient was seen in holding room.  Consent obtained.  The  operative extremity was initialed by me. Patient received preop 2 g Kefzol.  Patient received 1000 mg tranexamic acid.  Patient taken to the operating room with anesthesia as listed above.  Patient had Humphries catheter.  Positioned in the lateral decubitus position for the appropriate hip.  Patient with pubic and sacral braces in the lateral position.  The appropriate lower extremity was prepped and draped sterilely.  Loban drape was used.  The posterior approach to the hip was used.   Incision was carried proximally positively.  The fascia was split in line with the incision with Justice scissors.  The Charnley retractor was placed superficial to the sciatic nerve.  The short external rotators from the quadratus femoris to the piriformis were released.  The capsule was opened superiorly.  The hip was dislocated.  The femoral neck cut was marked 1.5 cm proximal to lesser trochanter with hip internally rotated 90 degrees.  The femoral neck cut was made with a power saw and the head was removed.  It was measured at 46 mm.  The wing retractors were placed posteriorly and a cobra retractor anteriorly and  Long inferiorly.  The capsule was incised.  Reaming started with hemispherical reamers at size 46 going up to size 52 in 1 mm increments at 45 degrees of abduction and 20 degrees of flexion.  Trial was tried at size 52 with a fair fit with soft bone.  Hip was irrigated.  Cup was placed in the previously mentioned degree of anteversion.  The true cup was then impacted in the previous degree of  anteversion with the cluster holes in the posterior superior aspect of the dome.  3 screws were placed, one in the posterior superior aspect and  one in the posterior aspect of the dome using 6.5 mm screws.  The true metallic MDM liner was locked into the cup and verified to be secure then protected with a sponge.  The proximal  femur was prepared using the box chisel and canal finder. Broaching was then done starting at size 0 then  going up to the final size 6  .  The trial reduction was done with a 48 size  head with -4 neck length.  It was stable and had good leg lengths.  The broach was then removed.  The canal was irrigated.  The true stem was then impacted, seating well. The trial was then tested with good stability.  The true MDM head was impacted on the  trunion. 30 ml of half percent Marcaine was mixed with 30 ml of saline and half ml of 1:1000 epinephrine.  40 ml of this was injected epidurally around the capsule and 20 ml of the lateral soft tissues.  Hip was thoroughly irrigated.  The capsule and piriformis  were repaired back with #2 Ethibond sutures.  The fascia was repaired with #2 Vicryl interrupted and the subcu tissue with 2-0 Vicryl interrupted and skin staples.  Patient had sterile jaswinder 7 dressing applied.  An abduction pillow placed.  Humphries  catheter removed.  Patient was stable in the operating room    Ángel Madrigal MD  10/5/2021 09:28 EDT

## 2021-10-05 NOTE — CONSULTS
[facility] Medicine Services      Patient Name: Stefany Mancera  : 1960  MRN: [id number]  Primary Care Physician:  Rodolfo Vizcaino MD  Date of admission: 10/5/2021      Subjective      Chief Complaint: Status post left total hip arthroplasty    History of Present Illness: Stefany Mancera is a 61 y.o. female who presented to [facility] on 10/5/2021 underwent left total hip arthroplasty, we were asked to see the patient for medical management of chronic medical issues.  Patient denies any chest pain, no nausea no vomiting.  Patient carries medical diagnosis of dyslipidemia, hypertension, depression, diabetes mellitus type 2 and chronic iron deficiency anemia.      Review of Systems   All other systems reviewed and are negative.       Personal History     Past Medical History:   Diagnosis Date   • Anemia    • Arthritis    • Chronic kidney disease, stage 4 (severe) (HCC) 2018   • Depression    • Diabetes mellitus ([facility])    • Hypertension    • Left hip pain 2021   • Mixed anxiety and depressive disorder 2016       Past Surgical History:   Procedure Laterality Date   • CHOLECYSTECTOMY     • HYSTERECTOMY     • TOTAL HIP ARTHROPLASTY Right 2021    Procedure: TOTAL HIP ARTHROPLASTY;  Surgeon: Ángel Madrigal MD;  Location: [facility];  Service: Orthopedics;  Laterality: Right;       Family History: family history includes Cancer in her mother; Heart disease in her father; Heart failure in her father. Otherwise pertinent FHx was reviewed and not pertinent to current issue.    Social History:  reports that she has never smoked. She has never used smokeless tobacco. She reports previous alcohol use. She reports that she does not use drugs.    Home Medications:  Prior to Admission Medications     Prescriptions Last Dose Informant Patient Reported? Taking?    calcitriol (ROCALTROL) 0.25 MCG capsule 10/1/2021  Yes Yes    Take 0.25 mcg by mouth Daily. HOLD DOS     "Cholecalciferol (Vitamin D3) 25 MCG (1000 UT) capsule 10/1/2021  Yes Yes    Take 1,000 Units by mouth. Stop 9-28 for surgery    ferrous sulfate 324 (65 Fe) MG tablet delayed-release EC tablet 10/5/2021  Yes Yes    Take 324 mg by mouth Daily With Breakfast.    glipizide (GLUCOTROL) 5 MG tablet 10/1/2021  Yes Yes    Take 5 mg by mouth 2 (Two) Times a Day Before Meals. HOLD DOS    hydrALAZINE (APRESOLINE) 25 MG tablet 10/5/2021  Yes Yes    Take 25 mg by mouth 3 (Three) Times a Day. Take dos    hydroCHLOROthiazide (HYDRODIURIL) 25 MG tablet 10/1/2021  Yes Yes    Take 25 mg by mouth Daily. HOLD DOS    oxyCODONE-acetaminophen (PERCOCET) 5-325 MG per tablet 10/4/2021  No Yes    1 or 2 tablets every 6 hours as needed    PARoxetine (PAXIL) 40 MG tablet 10/5/2021  Yes Yes    Take 40 mg by mouth Every Morning. Take dos    pioglitazone (ACTOS) 30 MG tablet 10/1/2021  Yes Yes    Take 30 mg by mouth Daily. HOLD DOS    pravastatin (PRAVACHOL) 20 MG tablet 10/1/2021  Yes Yes    Take 20 mg by mouth Daily.    sodium bicarbonate 325 MG tablet 10/1/2021  Yes Yes    Take 325 mg by mouth 3 (Three) Times a Day.    venlafaxine (EFFEXOR) 37.5 MG tablet 10/5/2021  Yes Yes    Take 37.5 mg by mouth Every Morning. Take dos    verapamil SR (CALAN-SR) 240 MG CR tablet 10/1/2021  Yes Yes    Take 240 mg by mouth Every Night.    doxycycline (MONODOX) 100 MG capsule   No No    Take 1 capsule by mouth 2 (Two) Times a Day.    gabapentin (Neurontin) 300 MG capsule   No No    Take 1 capsule by mouth At Night As Needed (Pain) for up to 14 days.    magnesium oxide (MAGOX) 400 (241.3 Mg) MG tablet tablet   Yes No    Take 400 mg by mouth Daily.            Allergies:  Allergies   Allergen Reactions   • Irbesartan Other (See Comments)     Kidney damage    • Lisinopril Cough   • Metformin Other (See Comments)     Kidney damage    • Tramadol Other (See Comments)     Makes her feel \"dopey\"- Just doesn't want       Objective      Vitals:   Temp:  [97.4 °F (36.3 " °C)-97.8 °F (36.6 °C)] 97.7 °F (36.5 °C)  Heart Rate:  [75-99] 90  Resp:  [9-20] 12  BP: (123-203)/() 139/85  Flow (L/min):  [2-10] 2  FiO2 (%):  [100 %] 100 %    Physical Exam  Vitals and nursing note reviewed.   Constitutional:       General: She is not in acute distress.     Appearance: Normal appearance. She is well-developed. She is not ill-appearing, toxic-appearing or diaphoretic.   HENT:      Head: Normocephalic and atraumatic.      Right Ear: Ear canal and external ear normal.      Left Ear: Ear canal and external ear normal.      Nose: Nose normal. No congestion or rhinorrhea.      Mouth/Throat:      Mouth: Mucous membranes are moist.      Pharynx: No oropharyngeal exudate.   Eyes:      General: No scleral icterus.        Right eye: No discharge.         Left eye: No discharge.      Extraocular Movements: Extraocular movements intact.      Conjunctiva/sclera: Conjunctivae normal.      Pupils: Pupils are equal, round, and reactive to light.   Neck:      Thyroid: No thyromegaly.      Vascular: No carotid bruit or JVD.      Trachea: No tracheal deviation.   Cardiovascular:      Rate and Rhythm: Normal rate and regular rhythm.      Pulses: Normal pulses.      Heart sounds: Normal heart sounds. No murmur heard.   No friction rub. No gallop.    Pulmonary:      Effort: Pulmonary effort is normal. No respiratory distress.      Breath sounds: Normal breath sounds. No stridor. No wheezing, rhonchi or rales.   Chest:      Chest wall: No tenderness.   Abdominal:      General: Bowel sounds are normal. There is no distension.      Palpations: Abdomen is soft. There is no mass.      Tenderness: There is no abdominal tenderness. There is no guarding or rebound.      Hernia: No hernia is present.   Musculoskeletal:         General: No swelling, tenderness, deformity or signs of injury. Normal range of motion.      Cervical back: Normal range of motion and neck supple. No rigidity. No muscular tenderness.      Right  lower leg: No edema.      Left lower leg: No edema.   Lymphadenopathy:      Cervical: No cervical adenopathy.   Skin:     General: Skin is warm and dry.      Coloration: Skin is not jaundiced or pale.      Findings: No bruising, erythema or rash.   Neurological:      General: No focal deficit present.      Mental Status: She is alert and oriented to person, place, and time. Mental status is at baseline.      Cranial Nerves: No cranial nerve deficit.      Sensory: No sensory deficit.      Motor: No weakness or abnormal muscle tone.      Coordination: Coordination normal.   Psychiatric:         Mood and Affect: Mood normal.         Behavior: Behavior normal.         Thought Content: Thought content normal.         Judgment: Judgment normal.            Result Review    Result Review:  I have personally reviewed the results from the time of this admission to 10/5/2021 19:55 EDT and agree with these findings:  [x]  Laboratory  [x]  Microbiology  [x]  Radiology  []  EKG/Telemetry   []  Cardiology/Vascular   []  Pathology  []  Old records  []  Other:  Most notable findings include:     Assessment/Plan        Active Hospital Problems:  Active Hospital Problems    Diagnosis    • Arthritis of left hip      Plan:     Status post left total hip arthroplasty, postop care as per primary, DVT prophylaxis as per plan.    Hypertension, vitals reviewed, stable, continue with, hydrochlorothiazide.    Dyslipidemia, continue statin, monitor LFTs as needed.    Chronic iron deficiency anemia, continue with iron supplement.    Diabetes mellitus type 2, Accu-Cheks stable, continue home regime, sliding scale as needed.    DVT prophylaxis as per primary    Thanks for consult    We will follow the patient with you    DVT prophylaxis:  Medical and mechanical DVT prophylaxis orders are present.    CODE STATUS:       Admission Status:  I believe this patient meets observation status.    I discussed the patient's findings and my recommendations  with patient.    This patient has been examined wearing appropriate Personal Protective Equipment and discussed with hospital infection control department. 10/05/21      Signature:

## 2021-10-05 NOTE — PLAN OF CARE
Goal Outcome Evaluation:              Outcome Summary: Admitted to unit from PACU. Currently on oxygen at 2 liters per minute. Was in recovery for some period of time d/t respiratory difficutlty. No acute signs of problems

## 2021-10-06 LAB
ANION GAP SERPL CALCULATED.3IONS-SCNC: 14 MMOL/L (ref 5–15)
BUN SERPL-MCNC: 27 MG/DL (ref 8–23)
BUN/CREAT SERPL: 18.6 (ref 7–25)
CALCIUM SPEC-SCNC: 7.4 MG/DL (ref 8.6–10.5)
CHLORIDE SERPL-SCNC: 101 MMOL/L (ref 98–107)
CO2 SERPL-SCNC: 22 MMOL/L (ref 22–29)
CREAT SERPL-MCNC: 1.45 MG/DL (ref 0.57–1)
GFR SERPL CREATININE-BSD FRML MDRD: 37 ML/MIN/1.73
GLUCOSE BLDC GLUCOMTR-MCNC: 125 MG/DL (ref 70–105)
GLUCOSE BLDC GLUCOMTR-MCNC: 65 MG/DL (ref 70–105)
GLUCOSE BLDC GLUCOMTR-MCNC: 86 MG/DL (ref 70–105)
GLUCOSE BLDC GLUCOMTR-MCNC: 91 MG/DL (ref 70–105)
GLUCOSE SERPL-MCNC: 145 MG/DL (ref 65–99)
HCT VFR BLD AUTO: 29.6 % (ref 34–46.6)
HGB BLD-MCNC: 9.5 G/DL (ref 12–15.9)
POTASSIUM SERPL-SCNC: 3.9 MMOL/L (ref 3.5–5.2)
SODIUM SERPL-SCNC: 137 MMOL/L (ref 136–145)

## 2021-10-06 PROCEDURE — 25010000002 CEFAZOLIN PER 500 MG: Performed by: ORTHOPAEDIC SURGERY

## 2021-10-06 PROCEDURE — 97530 THERAPEUTIC ACTIVITIES: CPT

## 2021-10-06 PROCEDURE — 99213 OFFICE O/P EST LOW 20 MIN: CPT | Performed by: HOSPITALIST

## 2021-10-06 PROCEDURE — 85018 HEMOGLOBIN: CPT | Performed by: ORTHOPAEDIC SURGERY

## 2021-10-06 PROCEDURE — G0378 HOSPITAL OBSERVATION PER HR: HCPCS

## 2021-10-06 PROCEDURE — 97110 THERAPEUTIC EXERCISES: CPT

## 2021-10-06 PROCEDURE — 97162 PT EVAL MOD COMPLEX 30 MIN: CPT

## 2021-10-06 PROCEDURE — 97165 OT EVAL LOW COMPLEX 30 MIN: CPT

## 2021-10-06 PROCEDURE — 80048 BASIC METABOLIC PNL TOTAL CA: CPT | Performed by: ORTHOPAEDIC SURGERY

## 2021-10-06 PROCEDURE — 82962 GLUCOSE BLOOD TEST: CPT

## 2021-10-06 PROCEDURE — 85014 HEMATOCRIT: CPT | Performed by: ORTHOPAEDIC SURGERY

## 2021-10-06 RX ORDER — GABAPENTIN 300 MG/1
300 CAPSULE ORAL NIGHTLY PRN
Qty: 14 CAPSULE | Refills: 1 | Status: SHIPPED | OUTPATIENT
Start: 2021-10-06 | End: 2021-10-20

## 2021-10-06 RX ORDER — OXYCODONE HYDROCHLORIDE AND ACETAMINOPHEN 5; 325 MG/1; MG/1
TABLET ORAL
Qty: 40 TABLET | Refills: 0 | Status: SHIPPED | OUTPATIENT
Start: 2021-10-06 | End: 2022-10-12

## 2021-10-06 RX ADMIN — CALCITRIOL 0.25 MCG: 0.25 CAPSULE ORAL at 08:39

## 2021-10-06 RX ADMIN — SODIUM BICARBONATE 650 MG TABLET 325 MG: at 15:39

## 2021-10-06 RX ADMIN — OXYCODONE 5 MG: 5 TABLET ORAL at 10:46

## 2021-10-06 RX ADMIN — OXYCODONE 5 MG: 5 TABLET ORAL at 17:29

## 2021-10-06 RX ADMIN — POLYETHYLENE GLYCOL 3350 17 G: 17 POWDER, FOR SOLUTION ORAL at 08:38

## 2021-10-06 RX ADMIN — FERROUS SULFATE TAB EC 324 MG (65 MG FE EQUIVALENT) 324 MG: 324 (65 FE) TABLET DELAYED RESPONSE at 07:30

## 2021-10-06 RX ADMIN — SODIUM BICARBONATE 650 MG TABLET 325 MG: at 23:02

## 2021-10-06 RX ADMIN — CEFAZOLIN SODIUM 2 G: 10 INJECTION, POWDER, FOR SOLUTION INTRAVENOUS at 03:57

## 2021-10-06 RX ADMIN — HYDRALAZINE HYDROCHLORIDE 25 MG: 25 TABLET, FILM COATED ORAL at 15:39

## 2021-10-06 RX ADMIN — VERAPAMIL HYDROCHLORIDE 240 MG: 240 TABLET, FILM COATED, EXTENDED RELEASE ORAL at 23:02

## 2021-10-06 RX ADMIN — Medication 3 ML: at 23:06

## 2021-10-06 RX ADMIN — SODIUM BICARBONATE 650 MG TABLET 325 MG: at 08:38

## 2021-10-06 RX ADMIN — RIVAROXABAN 10 MG: 10 TABLET, FILM COATED ORAL at 08:39

## 2021-10-06 RX ADMIN — HYDROCHLOROTHIAZIDE 25 MG: 25 TABLET ORAL at 08:39

## 2021-10-06 RX ADMIN — GLIPIZIDE 5 MG: 5 TABLET ORAL at 07:30

## 2021-10-06 RX ADMIN — PIOGLITAZONE 30 MG: 30 TABLET ORAL at 08:39

## 2021-10-06 RX ADMIN — PAROXETINE HYDROCHLORIDE 40 MG: 20 TABLET, FILM COATED ORAL at 07:30

## 2021-10-06 RX ADMIN — VENLAFAXINE 37.5 MG: 75 TABLET ORAL at 07:30

## 2021-10-06 RX ADMIN — HYDRALAZINE HYDROCHLORIDE 25 MG: 25 TABLET, FILM COATED ORAL at 08:38

## 2021-10-06 RX ADMIN — SODIUM CHLORIDE 125 ML/HR: 9 INJECTION, SOLUTION INTRAVENOUS at 02:18

## 2021-10-06 RX ADMIN — GLIPIZIDE 5 MG: 5 TABLET ORAL at 18:24

## 2021-10-06 RX ADMIN — ATORVASTATIN CALCIUM 10 MG: 10 TABLET, FILM COATED ORAL at 08:39

## 2021-10-06 NOTE — THERAPY EVALUATION
Patient Name: Stefany Mancera  : 1960    MRN: [id number]                              Today's Date: 10/6/2021       Admit Date: 10/5/2021    Visit Dx:     ICD-10-CM ICD-9-CM   1. Arthritis of right hip  M16.11 716.95     Patient Active Problem List   Diagnosis   • Arthritis of right hip   • Diabetic peripheral neuropathy (HCC)   • Essential hypertension   • Mixed anxiety and depressive disorder   • Mixed hyperlipidemia   • Obesity   • Osteoarthritis   • Chronic kidney disease, stage 4 (severe) ([facility])   • Type 2 diabetes mellitus without complication ([facility])   • Vitamin D deficiency   • Osteoarthritis of right hip   • Arthritis of left hip     Past Medical History:   Diagnosis Date   • Anemia    • Arthritis    • Chronic kidney disease, stage 4 (severe) ([facility]) 2018   • Depression    • Diabetes mellitus ([facility])    • Hypertension    • Left hip pain 2021   • Mixed anxiety and depressive disorder 2016     Past Surgical History:   Procedure Laterality Date   • CHOLECYSTECTOMY     • HYSTERECTOMY     • TOTAL HIP ARTHROPLASTY Right 2021    Procedure: TOTAL HIP ARTHROPLASTY;  Surgeon: Ángel Madrigal MD;  Location: [facility];  Service: Orthopedics;  Laterality: Right;     General Information     Row Name 10/06/21 1133          Physical Therapy Time and Intention    Document Type  evaluation  -     Mode of Treatment  physical therapy  -     Row Name 10/06/21 1133          General Information    Prior Level of Function  independent:;all household mobility Pt ambulates household distances w/ rollator. Min Afor ADLs and bed mobility with some assist from   -     Existing Precautions/Restrictions  hip, posterior;left;weight bearing;other (see comments) LLE WBAT  -     Row Name 10/06/21 1133          Living Environment    Lives With  spouse  -     Row Name 10/06/21 1133          Home Main Entrance    Number of Stairs, Main Entrance  none ramped entrance  -     Row Name 10/06/21 1133           Stairs Within Home, Primary    Number of Stairs, Within Home, Primary  none  -     Row Name 10/06/21 1133          Cognition    Orientation Status (Cognition)  oriented x 4  -     Row Name 10/06/21 1133          Safety Issues, Functional Mobility    Safety Issues Affecting Function (Mobility)  safety precautions follow-through/compliance;awareness of need for assistance  -     Impairments Affecting Function (Mobility)  balance;coordination;endurance/activity tolerance;shortness of breath;strength  -       User Key  (r) = Recorded By, (t) = Taken By, (c) = Cosigned By    Initials Name Provider Type    Sue Harrison PT Physical Therapist        Mobility     Row Name 10/06/21 1141          Bed Mobility    Bed Mobility  supine-sit  -     Supine-Sit [place] (Bed Mobility)  moderate assist (50% patient effort);2 person assist  -     Assistive Device (Bed Mobility)  bed rails;head of bed elevated  -     Comment (Bed Mobility)  Pt required mod A to abduct LLE to EOB during supine to sit transfer and mod A at trunk to sit EOB.  -     Row Name 10/06/21 1141          Sit-Stand Transfer    Sit-Stand [place] (Transfers)  moderate assist (50% patient effort);2 person assist stand to sit transfer required max A x2.  -     Assistive Device (Sit-Stand Transfers)  walker, front-wheeled  -     Row Name 10/06/21 1141          Gait/Stairs (Locomotion)    [place] Level (Gait)  contact guard  -     Assistive Device (Gait)  walker, front-wheeled  -     Distance in Feet (Gait)  5ft  -     Deviations/Abnormal Patterns (Gait)  left sided deviations;antalgic;stride length decreased  -     Bilateral Gait Deviations  heel strike decreased  -     Left Sided Gait Deviations  weight shift ability decreased  -       User Key  (r) = Recorded By, (t) = Taken By, (c) = Cosigned By    Initials Name Provider Type    Sue Harrison PT Physical Therapist        Obj/Interventions     Row Name  10/06/21 1148          Range of Motion Comprehensive    Comment, General Range of Motion  RLE ROM WFL, AUTUMN LLE hip flexion ROM due to restrictions, LLE knee and ankle ROM WFL  -     Row Name 10/06/21 1148          Strength Comprehensive (MMT)    General Manual Muscle Testing (MMT) Assessment  lower extremity strength deficits identified  -     Comment, General Manual Muscle Testing (MMT) Assessment  RLE hip flexion 3+/5, knee flex/ext and ankle DF, 4-/5. LLE hip flexion 3/5, knee flex/ext and and ankle DF 4-/5  -     Row Name 10/06/21 1148          Balance    Balance Assessment  sitting static balance;standing static balance;standing dynamic balance  -     Static Sitting Balance  WFL  -     Static Standing Balance  WFL;supported  -     Dynamic Standing Balance  moderate impairment;supported  -     Row Name 10/06/21 1148          Sensory Assessment (Somatosensory)    Sensory Assessment (Somatosensory)  LE sensation intact  -       User Key  (r) = Recorded By, (t) = Taken By, (c) = Cosigned By    Initials Name Provider Type    ARMANDO Sue Tapia, PT Physical Therapist        Goals/Plan     Row Name 10/06/21 1154          Bed Mobility Goal 1 (PT)    Activity/Assistive Device (Bed Mobility Goal 1, PT)  bed mobility activities, all  -     [place] Level/Cues Needed (Bed Mobility Goal 1, PT)  modified independence  -     Time Frame (Bed Mobility Goal 1, PT)  long term goal (LTG);2 weeks  -     Row Name 10/06/21 1154          Transfer Goal 1 (PT)    Activity/Assistive Device (Transfer Goal 1, PT)  transfers, all  -     [place] Level/Cues Needed (Transfer Goal 1, PT)  modified independence  -     Time Frame (Transfer Goal 1, PT)  long term goal (LTG);2 weeks  -     Row Name 10/06/21 1154          Gait Training Goal 1 (PT)    Activity/Assistive Device (Gait Training Goal 1, PT)  gait (walking locomotion);walker, rolling  -     [place] Level (Gait Training Goal 1, PT)  modified  independence  -     Distance (Gait Training Goal 1, PT)  100ft w/ RW  -ARMANDO     Time Frame (Gait Training Goal 1, PT)  long term goal (LTG);2 weeks  -       User Key  (r) = Recorded By, (t) = Taken By, (c) = Cosigned By    Initials Name Provider Type    Sue Harrison, PT Physical Therapist        Clinical Impression     Row Name 10/06/21 1133          Pain    Additional Documentation  Pain Scale: Numbers Pre/Post-Treatment (Group)  -     Row Name 10/06/21 1133          Pain Scale: Numbers Pre/Post-Treatment    Pretreatment Pain Rating  2/10  -ARMANDO     Posttreatment Pain Rating  2/10  -ARMANDO     Pain Location - Side  Left  -     Pain Location  hip  -     Row Name 10/06/21 1133          Plan of Care Review    Outcome Summary  Pt is a 61 year old female who presents to [facility] s/p elective L posterior LUIZA on 10/5. Pt demonstrated impaired functional mobility, activity tolerance, balance, and BLE strength this date. Pt requiring mod A to complete supine to sit, max A x2 for stand to sit and mod A x2 for sit to stand, and CGA for ambulation x5ft w/ RW, LLE WBAT. Pt demonstrated understanding of posterior hip precautions. Recommend inpatient rehab at this time as pt is not at PLOF and unsafe to return home.  -     Row Name 10/06/21 1133          Therapy Assessment/Plan (PT)    Predicted Duration of Therapy Intervention (PT)  Until d/c  -     Row Name 10/06/21 1133          Vital Signs    Pre SpO2 (%)  97 on 2L via nc  -ARMANDO     O2 Delivery Pre Treatment  nasal cannula  -     Post SpO2 (%)  96 on 2L via nc  -ARMANDO     O2 Delivery Post Treatment  nasal cannula  -     Row Name 10/06/21 1133          Positioning and Restraints    Pre-Treatment Position  in bed  -     Post Treatment Position  chair  -     In Chair  reclined;call light within reach;encouraged to call for assist;exit alarm on  -       User Key  (r) = Recorded By, (t) = Taken By, (c) = Cosigned By    Initials Name Provider Type    Sue Harrison,  PT Physical Therapist        Outcome Measures     Row Name 10/06/21 1155          How much help from another person do you currently need...    Turning from your back to your side while in flat bed without using bedrails?  2  -ARMANDO     Moving from lying on back to sitting on the side of a flat bed without bedrails?  2  -ARMANDO     Moving to and from a bed to a chair (including a wheelchair)?  2  -ARMANDO     Standing up from a chair using your arms (e.g., wheelchair, bedside chair)?  2  -ARMANDO     Climbing 3-5 steps with a railing?  2  -ARMANDO     To walk in hospital room?  3  -ARMANDO     AM-PAC 6 Clicks Score (PT)  13  -     Row Name 10/06/21 1155          Functional Assessment    Outcome Measure Options  AM-PAC 6 Clicks Basic Mobility (PT)  -       User Key  (r) = Recorded By, (t) = Taken By, (c) = Cosigned By    Initials Name Provider Type    Sue Harrison, MALINDA Physical Therapist                       Physical Therapy Education                 Title: PT OT SLP Therapies (In Progress)     Topic: Physical Therapy (Done)     Point: Mobility training (Done)     Learning Progress Summary           Patient Acceptance, E,TB, VU by  at 10/6/2021 1156                   Point: Home exercise program (Done)     Learning Progress Summary           Patient Acceptance, E,TB, VU by  at 10/6/2021 1156                   Point: Body mechanics (Done)     Learning Progress Summary           Patient Acceptance, E,TB, VU by  at 10/6/2021 1156                   Point: Precautions (Done)     Learning Progress Summary           Patient Acceptance, E,TB, VU by  at 10/6/2021 1156                               User Key     Initials Effective Dates Name Provider Type [facility] 08/23/21 -  Sue Tapia, MALINDA Physical Therapist PT              PT Recommendation and Plan     Outcome Summary: Pt is a 61 year old female who presents to [facility] s/p elective L posterior LUIZA on 10/5. Pt demonstrated impaired functional mobility, activity tolerance,  balance, and BLE strength this date. Pt requiring mod A to complete supine to sit, max A x2 for stand to sit and mod A x2 for sit to stand, and CGA for ambulation x5ft w/ RW, LLE WBAT. Pt demonstrated understanding of posterior hip precautions. Recommend inpatient rehab at this time as pt is not at PLOF and unsafe to return home.     Time Calculation:   PT Charges     Row Name 10/06/21 1156             Time Calculation    Start Time  0918  -      Stop Time  0945  -ARMANDO      Time Calculation (min)  27 min  -ARMANDO      PT Received On  10/06/21  -ARMANDO      PT - Next Appointment  10/07/21  -ARMANDO      PT Goal Re-Cert Due Date  10/20/21  -ARMANDO        User Key  (r) = Recorded By, (t) = Taken By, (c) = Cosigned By    Initials Name Provider Type    Sue Harrison, MALINDA Physical Therapist        Therapy Charges for Today     Code Description Service Date Service Provider Modifiers Qty    36066070035 HC PT EVAL MOD COMPLEXITY 4 10/6/2021 Sue Tapia, PT GP 1          PT G-Codes  Outcome Measure Options: AM-PAC 6 Clicks Basic Mobility (PT)  AM-PAC 6 Clicks Score (PT): 13    Sue Tapia PT  10/6/2021

## 2021-10-06 NOTE — PLAN OF CARE
Patient resting quietly with eyes closed. Denies pain/discomfort. Patient A&Ox4 when woken. VSS. No oxygen desaturation noted this shift. Will continue to monitor. Care plan ongoing.

## 2021-10-06 NOTE — PLAN OF CARE
Goal Outcome Evaluation:  Plan of Care Reviewed With: patient           Outcome Summary: In bed at this time. Has worked with PT/OT today. Has been up in the chair several times. Therapy is recommending inpatient rehab. On room air at the moment and is maintaining the the mid to high 90's. Few complaints of pain

## 2021-10-06 NOTE — DISCHARGE PLACEMENT REQUEST
"Stefany Diaz (61 y.o. Female)     Date of Birth Social Security Number Address Home Phone MRN    1960  [address]  [place] [address] [phone] [id number]    Roman Catholic Marital Status          Church        Admission Date Admission Type Admitting Provider Attending Provider Department, Room/Bed    10/5/21 Elective Ángel Madrigal MD Conner, John M, MD [facility] SURGICAL INPATIENT, 4132/1    Discharge Date Discharge Disposition Discharge Destination         Home-Health Care [facility]              Attending Provider: Ángel Madrigal MD    Allergies: Irbesartan, Lisinopril, Metformin, Tramadol    Isolation: None   Infection: None   Code Status: Prior    Ht: 172.7 cm (68\")   Wt: 114 kg (251 lb 5.2 oz)    Admission Cmt: None   Principal Problem: None                Active Insurance as of 10/5/2021     Primary Coverage     Payor Plan Insurance Group Employer/Plan Group    [employer] [id number]     Payor Plan Address Payor Plan Phone Number Payor Plan Fax Number Effective Dates    [address] [phone]  1/1/2021 - None Entered    [facility] [id number]       Subscriber Name Subscriber Birth Date Member ID       OLEKSANDR DIAZ 3/11/1959 [id number]                 Emergency Contacts      (Rel.) Home Phone Work Phone Mobile Phone    OLEKSANDR DIAZ (Spouse) -- -- [phone]               History & Physical      Ángel Madrigal MD at 10/05/21 0718        See H&P faxed from office.  No changes in history or physical.  Paper copy placed on chart    Electronically signed by Ángel Madrigal MD at 10/05/21 0718     H&P signed by [employer], [employer] at 10/05/21 1042      Scan on 10/5/2021 by [employer], [employer]: H&amp;P L HIP, DR MADRIGAL, 09/17/2021          Electronically signed by [employer] at 10/05/21 1042          Physical Therapy Notes (most recent note)      Miley Raygoza, PT at 10/05/21 7360  Version 1 of 1       Pt currently still in PACU due to " respiratory status.  PT will follow up at later date.    Miley Raygoza PT, DPT, GCS     Electronically signed by Miley Raygoza, PT at 10/05/21 1504       Occupational Therapy Notes (most recent note)    No notes exist for this encounter.

## 2021-10-06 NOTE — PLAN OF CARE
Goal Outcome Evaluation:  Plan of Care Reviewed With: patient        Progress: no change  Outcome Summary: Pt. is 62 y/o female admit POD # 1 posterior LUIZA. Pt. states she lives at home w/ spouse and daughter, spouse works during the day 2nd shift. Pt. reports ADL independence at baseline, requires max A x 2 sit to stand transfer from armchair w/ multiple attempts secondary to increased pain and pt. hesitancy/anxiety regarding falls. Pt. provided complete assist for LB ADLs this date secondary to posterior hip precautions following education. Pt. not safe to d/c home at this time and will require IP rehab at d/c.

## 2021-10-06 NOTE — DISCHARGE PLACEMENT REQUEST
"Stefany Diaz (61 y.o. Female)     Date of Birth Social Security Number Address Home Phone MRN    1960  [address]  [place] [address] [phone] [id number]    Mormonism Marital Status          Orthodoxy        Admission Date Admission Type Admitting Provider Attending Provider Department, Room/Bed    10/5/21 Elective Ángel Madrigal MD Conner, John M, MD [facility] SURGICAL INPATIENT, 4132/1    Discharge Date Discharge Disposition Discharge Destination         Home-Health Care [facility]              Attending Provider: Ángel Madrigal MD    Allergies: Irbesartan, Lisinopril, Metformin, Tramadol    Isolation: None   Infection: None   Code Status: Prior    Ht: 172.7 cm (68\")   Wt: 114 kg (251 lb 5.2 oz)    Admission Cmt: None   Principal Problem: None                Active Insurance as of 10/5/2021     Primary Coverage     Payor Plan Insurance Group Employer/Plan Group    [employer] [id number]     Payor Plan Address Payor Plan Phone Number Payor Plan Fax Number Effective Dates    [address] [phone]  1/1/2021 - None Entered    [facility] [address]       Subscriber Name Subscriber Birth Date Member ID       OLEKSANDR DIAZ 3/11/1959 [id number]                 Emergency Contacts      (Rel.) Home Phone Work Phone Mobile Phone    OLEKSANDR DIAZ (Spouse) -- -- [phone]               History & Physical      Ángel Madrigal MD at 10/05/21 0718        See H&P faxed from office.  No changes in history or physical.  Paper copy placed on chart    Electronically signed by Ángel Madrigal MD at 10/05/21 0718     H&P signed by [employer], [employer] at 10/05/21 1042      Scan on 10/5/2021 by [employer], [employer]: H&amp;P [place]HA HIP, DR MADRIGAL, 09/17/2021          Electronically signed by [employer], [employer] at 10/05/21 1042       "

## 2021-10-06 NOTE — THERAPY EVALUATION
Patient Name: Stefany Mancera  : 1960    MRN: [id number]                              Today's Date: 10/6/2021       Admit Date: 10/5/2021    Visit Dx:     ICD-10-CM ICD-9-CM   1. Arthritis of right hip  M16.11 716.95     Patient Active Problem List   Diagnosis   • Arthritis of right hip   • Diabetic peripheral neuropathy (HCC)   • Essential hypertension   • Mixed anxiety and depressive disorder   • Mixed hyperlipidemia   • Obesity   • Osteoarthritis   • Chronic kidney disease, stage 4 (severe) (HCC)   • Type 2 diabetes mellitus without complication (HCC)   • Vitamin D deficiency   • Osteoarthritis of right hip   • Arthritis of left hip     Past Medical History:   Diagnosis Date   • Anemia    • Arthritis    • Chronic kidney disease, stage 4 (severe) (HCC) 2018   • Depression    • Diabetes mellitus (HCC)    • Hypertension    • Left hip pain 2021   • Mixed anxiety and depressive disorder 2016     Past Surgical History:   Procedure Laterality Date   • CHOLECYSTECTOMY     • HYSTERECTOMY     • TOTAL HIP ARTHROPLASTY Right 2021    Procedure: TOTAL HIP ARTHROPLASTY;  Surgeon: Ángel Madrigal MD;  Location: [facility];  Service: Orthopedics;  Laterality: Right;     General Information     Row Name 10/06/21 1354 10/06/21 1021       OT Time and Intention    Document Type  evaluation  -MP  evaluation  -MP    Mode of Treatment  occupational therapy  -MP  individual therapy  -MP    Row Name 10/06/21 1354 10/06/21 1021       General Information    Patient Profile Reviewed  yes  -MP  yes  -MP    Prior Level of Function  independent:;ADL's  -MP  independent:;ADL's  -MP    Existing Precautions/Restrictions  hip, posterior;left;weight bearing;other (see comments)  -MP  --    Row Name 10/06/21 1354 10/06/21 1021       Living Environment    Lives With  spouse  -MP  spouse  -MP    Row Name 10/06/21 1354 10/06/21 1021       Cognition    Orientation Status (Cognition)  oriented x 4  -MP  oriented x 3  -MP     Row Name 10/06/21 1354 10/06/21 1021       Safety Issues, Functional Mobility    Safety Issues Affecting Function (Mobility)  insight into deficits/self-awareness;judgment  -MP  --    Impairments Affecting Function (Mobility)  balance;coordination;endurance/activity tolerance;shortness of breath;strength  -MP  balance  -MP      User Key  (r) = Recorded By, (t) = Taken By, (c) = Cosigned By    Initials Name Provider Type    Bayron Dupont OT Occupational Therapist          Mobility/ADL's     Row Name 10/06/21 1355          Bed Mobility    Bed Mobility  sit-supine  -MP     Sit-Supine Mcgregor (Bed Mobility)  moderate assist (50% patient effort);2 person assist  -MP     Row Name 10/06/21 1355          Transfers    Transfers  sit-stand transfer  -MP     Sit-Stand Mcgregor (Transfers)  maximum assist (25% patient effort);2 person assist  -MP     Row Name 10/06/21 1355          Activities of Daily Living    BADL Assessment/Intervention  lower body dressing  -MP     Row Name 10/06/21 135          Lower Body Dressing Assessment/Training    Mcgregor Level (Lower Body Dressing)  don;doff;socks;dependent (less than 25% patient effort)  -MP       User Key  (r) = Recorded By, (t) = Taken By, (c) = Cosigned By    Initials Name Provider Type    Bayron Dupont OT Occupational Therapist        Obj/Interventions     Row Name 10/06/21 1355          Range of Motion Comprehensive    Comment, General Range of Motion  BUE WFL  -MP     Row Name 10/06/21 1355          Strength Comprehensive (MMT)    Comment, General Manual Muscle Testing (MMT) Assessment  BUE 4-/5  -MP     Row Name 10/06/21 1355          Balance    Static Standing Balance  mild impairment;supported;standing  -MP     Dynamic Standing Balance  moderate impairment;supported;standing  -MP     Balance Interventions  sitting;standing;sit to stand;supported;static;dynamic;occupation based/functional task  -MP       User Key  (r) = Recorded By, (t) =  Taken By, (c) = Cosigned By    Initials Name Provider Type    Bayron Dupont OT Occupational Therapist        Goals/Plan     Row Name 10/06/21 1409          Bed Mobility Goal 1 (OT)    Activity/Assistive Device (Bed Mobility Goal 1, OT)  bed mobility activities, all  -MP     Falls Level/Cues Needed (Bed Mobility Goal 1, OT)  minimum assist (75% or more patient effort)  -MP     Time Frame (Bed Mobility Goal 1, OT)  long term goal (LTG);2 weeks  -MP     Row Name 10/06/21 1407          Transfer Goal 1 (OT)    Activity/Assistive Device (Transfer Goal 1, OT)  sit-to-stand/stand-to-sit;toilet  -MP     Falls Level/Cues Needed (Transfer Goal 1, OT)  moderate assist (50-74% patient effort)  -MP     Time Frame (Transfer Goal 1, OT)  long term goal (LTG);2 weeks  -MP     Row Name 10/06/21 1401          Dressing Goal 1 (OT)    Activity/Device (Dressing Goal 1, OT)  lower body dressing  -MP     Falls/Cues Needed (Dressing Goal 1, OT)  moderate assist (50-74% patient effort);1 person assist  -MP       User Key  (r) = Recorded By, (t) = Taken By, (c) = Cosigned By    Initials Name Provider Type    Bayron Dupont OT Occupational Therapist        Clinical Impression     Row Name 10/06/21 8942          Pain Assessment    Additional Documentation  Pain Scale: Numbers Pre/Post-Treatment (Group);Pain Scale: FACES Pre/Post-Treatment (Group)  -MP     Row Name 10/06/21 5428          Pain Scale: FACES Pre/Post-Treatment    Pain: FACES Scale, Pretreatment  6-->hurts even more  -MP     Posttreatment Pain Rating  8-->hurts whole lot  -MP     Pain Location - Side  Left  -MP     Pain Location - Orientation  lower  -MP     Pain Location  extremity  -MP     Row Name 10/06/21 2539          Plan of Care Review    Plan of Care Reviewed With  patient  -MP     Progress  no change  -MP     Outcome Summary  Pt. is 62 y/o female admit POD # 1 posterior LUIZA. Pt. states she lives at home w/ spouse and daughter, spouse  works during the day 2nd shift. Pt. reports ADL independence at baseline, requires max A x 2 sit to stand transfer from armchair w/ multiple attempts secondary to increased pain and pt. hesitancy/anxiety regarding falls. Pt. provided complete assist for LB ADLs this date secondary to posterior hip precautions following education. Pt. not safe to d/c home at this time and will require IP rehab at d/c.  -MP     Row Name 10/06/21 1422          Therapy Assessment/Plan (OT)    Rehab Potential (OT)  good, to achieve stated therapy goals  -MP     Criteria for Skilled Therapeutic Interventions Met (OT)  yes;skilled treatment is necessary  -MP     Therapy Frequency (OT)  3 times/wk  -MP     Row Name 10/06/21 1354          Therapy Plan Review/Discharge Plan (OT)    Anticipated Discharge Disposition (OT)  inpatient rehabilitation facility  -MP     Row Name 10/06/21 1354          Vital Signs    Pre Patient Position  Sitting  -MP     Intra Patient Position  Standing  -MP     Post Patient Position  Supine  -MP     Row Name 10/06/21 8858          Positioning and Restraints    Pre-Treatment Position  sitting in chair/recliner  -MP     Post Treatment Position  bed  -MP     In Bed  supine;call light within reach;encouraged to call for assist;exit alarm on  -MP       User Key  (r) = Recorded By, (t) = Taken By, (c) = Cosigned By    Initials Name Provider Type    Bayron Dupont OT Occupational Therapist        Outcome Measures     Row Name 10/06/21 6137          How much help from another person do you currently need...    Turning from your back to your side while in flat bed without using bedrails?  2  -ARMANDO     Moving from lying on back to sitting on the side of a flat bed without bedrails?  2  -ARMANDO     Moving to and from a bed to a chair (including a wheelchair)?  2  -ARMANDO     Standing up from a chair using your arms (e.g., wheelchair, bedside chair)?  2  -ARMANDO     Climbing 3-5 steps with a railing?  2  -ARMANDO     To walk in hospital  room?  3  -     AM-PAC 6 Clicks Score (PT)  13  -ARMANDO     Row Name 10/06/21 1155          Functional Assessment    Outcome Measure Options  AM-PAC 6 Clicks Basic Mobility (PT)  -       User Key  (r) = Recorded By, (t) = Taken By, (c) = Cosigned By    Initials Name Provider Type    Sue Harrison, PT Physical Therapist          Occupational Therapy Education                 Title: PT OT SLP Therapies (In Progress)     Topic: Occupational Therapy (In Progress)     Point: ADL training (Done)     Description:   Instruct learner(s) on proper safety adaptation and remediation techniques during self care or transfers.   Instruct in proper use of assistive devices.              Learning Progress Summary           Patient Acceptance, E,TB, VU by  at 10/6/2021 1405                   Point: Home exercise program (Not Started)     Description:   Instruct learner(s) on appropriate technique for monitoring, assisting and/or progressing therapeutic exercises/activities.              Learner Progress:  Not documented in this visit.          Point: Precautions (Done)     Description:   Instruct learner(s) on prescribed precautions during self-care and functional transfers.              Learning Progress Summary           Patient Acceptance, E,TB, VU by  at 10/6/2021 1405                   Point: Body mechanics (Done)     Description:   Instruct learner(s) on proper positioning and spine alignment during self-care, functional mobility activities and/or exercises.              Learning Progress Summary           Patient Acceptance, E,TB, VU by  at 10/6/2021 1405                               User Key     Initials Effective Dates Name Provider Type Discipline     06/16/21 -  Bayron Wilburn OT Occupational Therapist OT              OT Recommendation and Plan  Therapy Frequency (OT): 3 times/wk  Plan of Care Review  Plan of Care Reviewed With: patient  Progress: no change  Outcome Summary: Pt. is 62 y/o female admit  POD # 1 posterior LUIZA. Pt. states she lives at home w/ spouse and daughter, spouse works during the day 2nd shift. Pt. reports ADL independence at baseline, requires max A x 2 sit to stand transfer from armchair w/ multiple attempts secondary to increased pain and pt. hesitancy/anxiety regarding falls. Pt. provided complete assist for LB ADLs this date secondary to posterior hip precautions following education. Pt. not safe to d/c home at this time and will require IP rehab at d/c.     Time Calculation:   Time Calculation- OT     Row Name 10/06/21 1406             Time Calculation- OT    OT Start Time  1016  -MP      OT Stop Time  1045  -      OT Time Calculation (min)  29 min  -      Total Timed Code Minutes- OT  10 minute(s)  -      OT Received On  10/06/21  -      OT - Next Appointment  10/08/21  -      OT Goal Re-Cert Due Date  10/20/21  -        User Key  (r) = Recorded By, (t) = Taken By, (c) = Cosigned By    Initials Name Provider Type     Bayron Wilburn OT Occupational Therapist        Therapy Charges for Today     Code Description Service Date Service Provider Modifiers Qty    [id number]  OT EVAL LOW COMPLEXITY 4 10/6/2021 Bayron Wilburn OT GO 1    [id number]  OT THERAPEUTIC ACT EA 15 MIN 10/6/2021 Bayron Wilburn OT GO 1               Bayron Wilburn OT  10/6/2021

## 2021-10-06 NOTE — CASE MANAGEMENT/SOCIAL WORK
Discharge Planning Assessment   [facility]     Patient Name: Stefany Mancera  MRN: [id number]  Today's Date: 10/6/2021    Admit Date: 10/5/2021    Discharge Needs Assessment     Row Name 10/06/21 1658       Living Environment    Lives With  spouse    Name(s) of Who Lives With Patient  Silas    Current Living Arrangements  home/apartment/condo    Primary Care Provided by  self    Provides Primary Care For  no one    Family Caregiver if Needed  spouse    Quality of Family Relationships  helpful;involved;supportive    Able to Return to Prior Arrangements  yes       Resource/Environmental Concerns    Resource/Environmental Concerns  none    Transportation Concerns  car, none       Transition Planning    Patient/Family Anticipates Transition to  home with help/services;inpatient rehabilitation facility    Patient/Family Anticipated Services at Transition  home health care;rehabilitation services    Transportation Anticipated  family or friend will provide       Discharge Needs Assessment    Readmission Within the Last 30 Days  no previous admission in last 30 days    Equipment Currently Used at Home  walker, rolling;cane, straight;wheelchair;glucometer    Concerns to be Addressed  discharge planning;care coordination/care conferences    Anticipated Changes Related to Illness  inability to care for self    Outpatient/Agency/Support Group Needs  inpatient rehabilitation facility;homecare agency    Discharge Facility/Level of Care Needs  home with home health;rehabilitation facility    Provided Post Acute Provider List?  Yes    Post Acute Provider List  Inpatient Rehab;Nursing Home    Delivered To  Patient    Method of Delivery  In person        Discharge Plan     Row Name 10/06/21 1650       Plan    Plan  DC plan: from home, referral to [facility] pending, precert started 10/6, no PASRR.    Provided Post Acute Provider List?  Yes    Post Acute Provider List  Inpatient Rehab;Nursing Home    Provided Post Acute Provider Quality &  "Resource List?  N/A    Delivered To  Patient    Method of Delivery  In person    Patient/Family in Agreement with Plan  yes    Plan Comments  Met with patient at bedside. Prior to surgery, patient independent with ADL's and drives. Lives at home with spouse. Has rolling walker, wheelchair, cane, and glucometer at home. Discussed PT/OT recommendations of inpatient rehab with patient. She states she doesn't want to go to a \"nursing home\" but is agreeable to acute rehab facility or home health. [facility] not in network with payor per liaison. Referral placed to [facility] (no subacute beds available) and [facility] (pending precert).        Continued Care and Services - Admitted Since 10/5/2021     Destination     Service Provider Request Status Selected Services Address Phone Fax Patient Preferred    [facility]  Pending - Request Sent N/A [address] [facility] [place] [address] [phone] --    [facility]  Decl[facility]  No Bed Available N/A [address] [place] [address] [phone] --          Home Medical Care     Service Provider Request Status Selected Services Address Phone Fax Patient Preferred     [facility]  Declined N/A [address] [facility] [place] [address] [phone] --              Demographic Summary     Row Name 10/06/21 1651       General Information    Admission Type  observation    Arrived From  PACU/recovery room    Referral Source  admission list    Reason for Consult  discharge planning;care coordination/care conference    Preferred Language  English     Used During This Interaction  no       Contact Information    Permission Granted to Share Info With          Functional Status     Row Name 10/06/21 1652       Functional Status    Usual Activity Tolerance  good    Current Activity Tolerance  poor       Functional Status, IADL    Medications  independent    Meal Preparation  independent    " Housekeeping  independent    Laundry  independent    Shopping  independent        Met with patient in room wearing PPE: mask, goggles.      Maintained distance greater than six feet and spent less than 15 minutes in the room.      Megan Naegele, RN      Office Phone: [phone]  Office Cell: [phone]

## 2021-10-06 NOTE — PROGRESS NOTES
LOS: 0 days   Patient Care Team:  Rodolfo Vizcaino MD as PCP - General (Family Medicine)        Subjective     Pain 3/10.  Patient was groggy yesterday and did not get out of bed.  Had Humphries catheter removed this morning.  Eating and urinating      Objective     Vital Signs  Vitals:    10/05/21 1732 10/05/21 1900 10/06/21 0012 10/06/21 0435   BP: 139/85 144/73 130/83 130/78   BP Location: Right arm Right arm Right arm Right arm   Patient Position: Lying Lying Lying Lying   Pulse: 90 85 80 73   Resp: 12 16 16 18   Temp: 97.7 °F (36.5 °C) 98 °F (36.7 °C) 98 °F (36.7 °C) 98.2 °F (36.8 °C)   TempSrc: Oral Oral Oral Oral   SpO2: 97% 96% 99% 97%   Weight:       Height:           Physical Exam:   [place]udson x3  Hip rotation looks normal.  5/5 dorsiflexion/plantarflexion with good sensation  Labs pending  X-ray shows slightly undersized femoral component, good leg length     Results Review:     I reviewed the patient's new clinical results.  I reviewed the patient's new imaging results    Lab Results (last 24 hours)     Procedure Component Value Units Date/Time    POC Glucose Once [[id number]]  (Abnormal) Collected: 10/05/21 2010    Specimen: Blood Updated: 10/05/21 2011     Glucose 189 mg/dL      Comment: Serial Number: [id number]:  [id number]       Blood Gas, Arterial - [[id number]]  (Abnormal) Collected: 10/05/21 1353    Specimen: Arterial Blood Updated: 10/05/21 1355     Site Right Brachial     John's Test N/A     pH, Arterial 7.358 pH units      pCO2, Arterial 44.6 mm Hg      pO2, Arterial 300.5 mm Hg      HCO3, Arterial 25.1 mmol/L      Base Excess, Arterial -0.6 mmol/L      Comment: Serial Number: [id number]:  [id number]        O2 Saturation, Arterial 99.9 %      CO2 Content 26.4 mmol/L      Barometric Pressure for Blood Gas --     Comment: N/A        Modality Adult Vent     FIO2 100 %      Ventilator Mode ;AC     Set Tidal Volume 550     PEEP 5     Hemodilution No     Respiratory Rate 18    Blood Gas,  Arterial - [[id number]]  (Abnormal) Collected: 10/05/21 1220    Specimen: Arterial Blood Updated: 10/05/21 1252     Site Right Brachial     John's Test N/A     pH, Arterial 7.005 pH units      pCO2, Arterial 117.2 mm Hg      pO2, Arterial 99.3 mm Hg      HCO3, Arterial 29.3 mmol/L      Base Excess, Arterial -4.8 mmol/L      Comment: Serial Number: [id number]:  [id number]        O2 Saturation, Arterial 91.9 %      CO2 Content 32.9 mmol/L      Barometric Pressure for Blood Gas --     Comment: N/A        Modality BiPap     FIO2 100 %      Hemodilution No    POC Glucose Once [[id number]]  (Abnormal) Collected: 10/05/21 1220    Specimen: Blood Updated: 10/05/21 1239     Glucose 257 mg/dL      Comment: Serial Number: [id number]:  [id number]       POC Lactate [[id number]]  (Normal) Collected: 10/05/21 1220    Specimen: Blood Updated: 10/05/21 1239     Lactate 1.0 mmol/L      Comment: Serial Number: [id number]:  [id number]       POCT Electrolytes +HGB +HCT [[id number]]  (Abnormal) Collected: 10/05/21 1220    Specimen: Blood Updated: 10/05/21 1239     Sodium 140 mmol/L      POC Potassium 3.7 mmol/L      Ionized Calcium 1.20 mmol/L      Comment: Serial Number: [id number]:  [id number]        Glucose 257 mg/dL      Hematocrit 38 %      Hemoglobin 13.0 g/dL         Imaging Results (Last 24 Hours)     Procedure Component Value Units Date/Time    XR Hip With or Without Pelvis 2 - 3 View Left [[id number]] Collected: 10/05/21 1115     Updated: 10/05/21 1118    Narrative:      DATE OF EXAM:  10/5/2021 11:09 AM     PROCEDURE:  XR HIP W OR WO PELVIS 2-3 VIEW LEFT-     INDICATIONS:  POST OP HIP REPLACEMENT Partial     COMPARISON:  No Comparisons Available     TECHNIQUE:   AP pelvis with AP and crosstable lateral view of the left hip.           FINDINGS:  New left hip replacement changes are present. The prosthesis appears  appropriately seated. No periprosthetic fracture is seen. Skin staples  project over the left upper thigh with the  subcutaneous gas, not  unexpected postoperatively. Right hip prosthesis is unchanged from the  to 4 2021 examination and appears appropriately positioned without  hardware complication.       Impression:      Satisfactory postoperative appearance status post left hip replacement.     Electronically Signed By-Kenzie Santoro MD On:10/5/2021 11:16 AM  This report was finalized on [id number] by  Kenzie Santoro MD.            Medication Review:   Scheduled Meds:atorvastatin, 10 mg, Oral, Daily  calcitriol, 0.25 mcg, Oral, Daily  ferrous sulfate, 324 mg, Oral, Daily With Breakfast  glipizide, 5 mg, Oral, BID AC  hydrALAZINE, 25 mg, Oral, TID  hydroCHLOROthiazide, 25 mg, Oral, Daily  PARoxetine, 40 mg, Oral, QAM  pioglitazone, 30 mg, Oral, Daily  polyethylene glycol, 17 g, Oral, Daily  rivaroxaban, 10 mg, Oral, Daily  sodium bicarbonate, 50 mEq, Intravenous, Once  sodium bicarbonate, 325 mg, Oral, TID  sodium chloride, 3 mL, Intravenous, Q12H  venlafaxine, 37.5 mg, Oral, QAM  verapamil SR, 240 mg, Oral, Nightly      Continuous Infusions:sodium chloride, 125 mL/hr, Last Rate: 125 mL/hr (10/06/21 0625)      PRN Meds:.•  acetaminophen  •  flumazenil  •  gabapentin  •  melatonin  •  Morphine **AND** naloxone  •  Morphine **AND** naloxone  •  naloxone  •  ondansetron **OR** ondansetron  •  oxyCODONE  •  oxyCODONE  •  promethazine  •  sodium chloride      Assessment/Plan     Doing well from left THR    Plan for disposition: Home today with home health therapy.  Return 10 to 12 days for staple removal    Ángel Madrigal MD  10/06/21  07:17 EDT

## 2021-10-06 NOTE — PLAN OF CARE
Goal Outcome Evaluation:        Outcome Summary: Pt is a 61 year old female who presents to [facility] s/p elective L posterior LUIZA on 10/5. Pt demonstrated impaired functional mobility, activity tolerance, balance, and BLE strength this date. Pt requiring mod A to complete supine to sit, max A x2 for stand to sit and mod A x2 for sit to stand, and CGA for ambulation x5ft w/ RW, LLE WBAT. Pt demonstrated understanding of posterior hip precautions. Recommend inpatient rehab at this time as pt is not at PLOF and unsafe to return home.

## 2021-10-06 NOTE — THERAPY TREATMENT NOTE
Subjective: Pt agreeable to therapeutic plan of care.    Objective:     Bed mobility - Mod-A  Transfers - Mod-A and Assist x 2  Ambulation - 15 feet CGA and with rolling walker, L antalgic gait, decreased weight shift to l, and B decreased heel strike.    WB'ing status: L Lower Extremity WBAT     Therapeutic Exercise: 10 Reps L Lower Extremity AROM   Ankle Pumps, Glut Sets, AAROM Heel slides <90 degrees, Hip Abduction    Precautions: Posterior hip precautions: NO hip flexion >90 degrees, NO hip adduction past midline, NO hip internal rotation    Pain: 2 VAS at L hip   Education: Provided education on importance of mobility and skilled verbal / tactile cueing throughout intervention.     Assessment: Stefany Mancera presents with functional mobility impairments which indicate the need for skilled intervention. Tolerating session today without incident. Pt is requiring v/c for reminders to decrease hip flexion during sit<>stand transfers and mod A x2 w/ RW. She reports fear of falling during ambulation w/ RW x15ft. She uses rollator at home and states she is pretty sure she has a RW at home. Pt strongly encouraged to use RW at home if she refuses inpatient rehab. Will continue to follow and progress as tolerated.     Plan/Recommendations:   Pt would benefit from Inpatient Rehabilitation placement at discharge from facility and requires no DME at discharge.   Pt desires Home with Home Health and Home with family assist at discharge. Pt cooperative; agreeable to therapeutic recommendations and plan of care.     Basic Mobility 6-click:  Rollin = Total, A lot = 2, A little = 3; 4 = None  Supine>Sit:   1 = Total, A lot = 2, A little = 3; 4 = None   Sit>Stand with arms:  1 = Total, A lot = 2, A little = 3; 4 = None  Bed>Chair:   1 = Total, A lot = 2, A little = 3; 4 = None  Ambulate in room:  1 = Total, A lot = 2, A little = 3; 4 = None  3-5 Steps with railin = Total, A lot = 2, A little = 3; 4 = None  Score:  12    Post-Tx Position: Supine with HOB Elevated, Alarms activated and Call light and personal items within reach. Ice pack reapplied following treatment.  PPE: gloves, surgical mask, eyewear protection

## 2021-10-06 NOTE — DISCHARGE PLACEMENT REQUEST
"Stefany Diaz (61 y.o. Female)     Date of Birth Social Security Number Address Home Phone MRN    1960  [id number]  [place] [address] [phone] [id number]    [place] Marital Status          [employer]        Admission Date Admission Type Admitting Provider Attending Provider Department, Room/Bed    10/5/21 Elective Ángel Madrigal MD Conner, John M, MD [facility] SURGICAL INPATIENT, 4132/1    Discharge Date Discharge Disposition Discharge Destination         [place]-Health Care [facility] [place]              Attending Provider: Ángel Madrigal MD    Allergies: Irbesartan, Lisinopril, Metformin, Tramadol    Isolation: None   Infection: None   Code Status: Prior    Ht: 172.7 cm (68\")   Wt: 114 kg (251 lb 5.2 oz)    Admission Cmt: None   Principal Problem: None                Active Insurance as of 10/5/2021     Primary Coverage     Payor Plan Insurance Group Employer/Plan Group    [employer] [id number]     Payor Plan Address Payor Plan Phone Number Payor Plan Fax Number Effective Dates    [address] [phone]  1/1/2021 - None Entered    [facility] [id number]       Subscriber Name Subscriber Birth Date Member ID       OLEKSANDR DIAZ 3/11/1959 [id number]                 Emergency Contacts      (Rel.) Home Phone Work Phone Mobile Phone    OLEKSANDR DIAZ (Spouse) -- -- [phone]               History & Physical      Ángel Madrigal MD at 10/05/21 0718        See H&P faxed from office.  No changes in history or physical.  Paper copy placed on chart    Electronically signed by Ángel Madrigal MD at 10/05/21 0718     H&P signed by [employer], [employer] at 10/05/21 1042      Scan on 10/5/2021 by [employer], [employer]: H&amp;P RADHA HIP, DR MADRIGAL, 09/17/2021          Electronically signed by [employer], [employer] at 10/05/21 1042       "

## 2021-10-06 NOTE — PROGRESS NOTES
[facility] Medicine Services Daily Progress Note    Patient Name: Stefany Mancera  : 1960  MRN: [id number]  Primary Care Physician:  Rodolfo Vizcaino MD  Date of admission: 10/5/2021      Subjective      Chief Complaint: Status post total hip arthroplasty      Subjective   Patient complaining of significant pain in left hip, denies for any chest pain, no nausea or vomiting.    Review of Systems   All other systems reviewed and are negative.         Objective      Vitals:   Temp:  [97.7 °F (36.5 °C)-98.3 °F (36.8 °C)] 98 °F (36.7 °C)  Heart Rate:  [73-90] 81  Resp:  [11-18] 18  BP: (123-149)/(68-85) 149/83  Flow (L/min):  [2-4] 2    Physical Exam  Vitals and nursing note reviewed.   Constitutional:       General: She is not in acute distress.     Appearance: Normal appearance. She is well-developed. She is not ill-appearing, toxic-appearing or diaphoretic.   HENT:      Head: Normocephalic and atraumatic.      Right Ear: Ear canal and external ear normal.      Left Ear: Ear canal and external ear normal.      Nose: Nose normal. No congestion or rhinorrhea.      Mouth/Throat:      Mouth: Mucous membranes are moist.      Pharynx: No oropharyngeal exudate.   Eyes:      General: No scleral icterus.        Right eye: No discharge.         Left eye: No discharge.      Extraocular Movements: Extraocular movements intact.      Conjunctiva/sclera: Conjunctivae normal.      Pupils: Pupils are equal, round, and reactive to light.   Neck:      Thyroid: No thyromegaly.      Vascular: No carotid bruit or JVD.      Trachea: No tracheal deviation.   Cardiovascular:      Rate and Rhythm: Normal rate and regular rhythm.      Pulses: Normal pulses.      Heart sounds: Normal heart sounds. No murmur heard.   No friction rub. No gallop.    Pulmonary:      Effort: Pulmonary effort is normal. No respiratory distress.      Breath sounds: Normal breath sounds. No stridor. No wheezing, rhonchi or rales.    Chest:      Chest wall: No tenderness.   Abdominal:      General: Bowel sounds are normal. There is no distension.      Palpations: Abdomen is soft. There is no mass.      Tenderness: There is no abdominal tenderness. There is no guarding or rebound.      Hernia: No hernia is present.   Musculoskeletal:         General: No swelling, tenderness, deformity or signs of injury. Normal range of motion.      Cervical back: Normal range of motion and neck supple. No rigidity. No muscular tenderness.      Right lower leg: No edema.      Left lower leg: No edema.   Lymphadenopathy:      Cervical: No cervical adenopathy.   Skin:     General: Skin is warm and dry.      Coloration: Skin is not jaundiced or pale.      Findings: No bruising, erythema or rash.   Neurological:      General: No focal deficit present.      Mental Status: She is alert and oriented to person, place, and time. Mental status is at baseline.      Cranial Nerves: No cranial nerve deficit.      Sensory: No sensory deficit.      Motor: No weakness or abnormal muscle tone.      Coordination: Coordination normal.   Psychiatric:         Mood and Affect: Mood normal.         Behavior: Behavior normal.         Thought Content: Thought content normal.         Judgment: Judgment normal.                 Result Review    Result Review:  I have personally reviewed the results from the time of this admission to 10/6/2021 15:12 EDT and agree with these findings:  []  Laboratory  []  Microbiology  []  Radiology  []  EKG/Telemetry   []  Cardiology/Vascular   []  Pathology  []  Old records  []  Other:  Most notable findings include:        Wounds (last 24 hours)      LDA Wound     Row Name 10/06/21 0705 10/05/21 1909 10/05/21 1814       Wound 10/05/21 0759 Left anterior hip Incision    Wound - Properties Group Placement Date: 10/05/21  -MS Placement Time: 0759 -MS Side: Left  -MS Orientation: anterior  -MS Location: hip  -MS Primary Wound Type: Incision  -MS    Dressing  Appearance  dry;intact;no drainage  -KL  dry;intact;no drainage  -CP  --    Closure  AUTUMN  -KL  AUTUMN  -CP  --    Base  dressing in place, unable to visualize  -KL  dressing in place, unable to visualize  -CP  --    Periwound  intact;warm  -KL  --  --    Periwound Temperature  warm  -KL  --  --    Periwound Skin Turgor  soft  -KL  --  --    Drainage Amount  none  -KL  none  -CP  --    Dressing Care  transparent film jaswinder drain with transparent dressing  -KL  --  --    Retired Wound - Properties Group Date first assessed: 10/05/21  -MS Time first assessed: 0759 -MS Side: Left  -MS Location: hip  -MS Primary Wound Type: Incision  -MS       Wound 10/05/21 1814 midline gluteal    Wound - Properties Group Placement Date: 10/05/21  - Placement Time: 1814 -AH Present on Hospital Admission: Y  - Orientation: midline  - Location: gluteal  -AH    Wound Image  --  --  Images linked: 1  -AH    Dressing Appearance  open to air  -KL  open to air  -CP  --    Closure  Open to air  -KL  Open to air  -CP  --    Base  moist;pink  -KL  blanchable;moist;pink  -CP  --    Periwound  intact;warm;blanchable  -KL  --  --    Periwound Temperature  warm  -KL  --  --    Periwound Skin Turgor  soft  -KL  --  --    Retired Wound - Properties Group Date first assessed: 10/05/21  - Time first assessed: 1814 - Present on Hospital Admission: Y  - Location: gluteal  -AH    Row Name 10/05/21 1730 10/05/21 1700 10/05/21 1652       Wound 10/05/21 0759 Left anterior hip Incision    Wound - Properties Group Placement Date: 10/05/21  -MS Placement Time: 0759 -MS Side: Left  -MS Orientation: anterior  -MS Location: hip  -MS Primary Wound Type: Incision  -MS    Dressing Appearance  no drainage;dry;intact  -KL  dry;intact;no drainage  -DM  dry;intact;no drainage  -DM    Closure  AUTUMN  -KL  AUTUMN  -DM  AUTUMN  -DM    Periwound  intact;warm  -KL  --  --    Periwound Temperature  warm  -KL  --  --    Periwound Skin Turgor  soft  -KL  --  --    Drainage  Amount  none  -KL  --  --    Dressing Care  -- jaswinder dressing intact  -KL  --  --    Retired Wound - Properties Group Date first assessed: 10/05/21  -MS Time first assessed: 0759 -MS Side: Left  -MS Location: hip  -MS Primary Wound Type: Incision  -MS       Wound 10/05/21 1814 midline gluteal    Wound - Properties Group Placement Date: 10/05/21  -AH Placement Time: 1814 -AH Present on Hospital Admission: Y  -AH Orientation: midline  -AH Location: gluteal  -AH    Retired Wound - Properties Group Date first assessed: 10/05/21  - Time first assessed: 1814 -AH Present on Hospital Admission: Y  -AH Location: gluteal  -AH    Row Name 10/05/21 1550             Wound 10/05/21 0759 Left anterior hip Incision    Wound - Properties Group Placement Date: 10/05/21  -MS Placement Time: 0759 -MS Side: Left  -MS Orientation: anterior  -MS Location: hip  -MS Primary Wound Type: Incision  -MS    Closure  AUTUMN  -DM      Retired Wound - Properties Group Date first assessed: 10/05/21  -MS Time first assessed: 0759  -MS Side: Left  -MS Location: hip  -MS Primary Wound Type: Incision  -MS      User Key  (r) = Recorded By, (t) = Taken By, (c) = Cosigned By    Initials Name Provider Type    DM Blanca Elias, RN Registered Nurse    Deysi Gerard RN Registered Nurse    Yaneth Pop LPN Licensed Nurse    Ernestine Ballard RN Registered Nurse    Noris Méndez RN Registered Nurse            Assessment/Plan      Brief Patient Summary:  Stefany Mancera is a 61 y.o. female who       atorvastatin, 10 mg, Oral, Daily  calcitriol, 0.25 mcg, Oral, Daily  ferrous sulfate, 324 mg, Oral, Daily With Breakfast  glipizide, 5 mg, Oral, BID AC  hydrALAZINE, 25 mg, Oral, TID  hydroCHLOROthiazide, 25 mg, Oral, Daily  PARoxetine, 40 mg, Oral, QAM  pioglitazone, 30 mg, Oral, Daily  polyethylene glycol, 17 g, Oral, Daily  rivaroxaban, 10 mg, Oral, Daily  sodium bicarbonate, 50 mEq, Intravenous, Once  sodium bicarbonate, 325 mg, Oral,  TID  sodium chloride, 3 mL, Intravenous, Q12H  venlafaxine, 37.5 mg, Oral, QAM  verapamil SR, 240 mg, Oral, Nightly       sodium chloride, 125 mL/hr, Last Rate: 125 mL/hr (10/06/21 0625)         Active Hospital Problems:  Active Hospital Problems    Diagnosis    • Arthritis of left hip      Plan:     Status post left total hip arthroplasty, postop care as per primary, DVT prophylaxis as per plan.    Hypertension, vitals reviewed, stable, continue with, hydrochlorothiazide.    Dyslipidemia, continue statin, monitor LFTs as needed.    Chronic iron deficiency anemia, continue with iron supplement.    Diabetes mellitus type 2, Accu-Cheks stable, continue home regime, sliding scale as needed.    DVT prophylaxis as per primary    DVT prophylaxis:  Medical and mechanical DVT prophylaxis orders are present.    CODE STATUS:         Disposition:  I expect patient to be discharged as per primary service.    This patient has been examined wearing appropriate Personal Protective Equipment and discussed with hospital infection control department. 10/06/21      Electronically signed by Tulio Hargrove MD, 10/06/21, 15:12 EDT.  [facility] Hospitalist Team

## 2021-10-06 NOTE — PLAN OF CARE
Assessment: Stefany Mancera presents with functional mobility impairments which indicate the need for skilled intervention. Tolerating session today without incident. Pt is requiring v/c for reminders to decrease hip flexion during sit<>stand transfers and mod A x2 w/ RW. She reports fear of falling during ambulation w/ RW x15ft. She uses rollator at home and states she is pretty sure she has a RW at home. Pt strongly encouraged to use RW at home if she refuses inpatient rehab. Will continue to follow and progress as tolerated.

## 2021-10-07 VITALS
TEMPERATURE: 98.6 F | HEART RATE: 85 BPM | BODY MASS INDEX: 38.09 KG/M2 | RESPIRATION RATE: 14 BRPM | OXYGEN SATURATION: 95 % | HEIGHT: 68 IN | WEIGHT: 251.32 LBS | SYSTOLIC BLOOD PRESSURE: 139 MMHG | DIASTOLIC BLOOD PRESSURE: 83 MMHG

## 2021-10-07 PROCEDURE — G0378 HOSPITAL OBSERVATION PER HR: HCPCS

## 2021-10-07 PROCEDURE — 97530 THERAPEUTIC ACTIVITIES: CPT

## 2021-10-07 PROCEDURE — 99213 OFFICE O/P EST LOW 20 MIN: CPT | Performed by: HOSPITALIST

## 2021-10-07 PROCEDURE — 97116 GAIT TRAINING THERAPY: CPT

## 2021-10-07 RX ORDER — DOCUSATE SODIUM 100 MG/1
100 CAPSULE, LIQUID FILLED ORAL 2 TIMES DAILY
Status: DISCONTINUED | OUTPATIENT
Start: 2021-10-07 | End: 2021-10-07 | Stop reason: HOSPADM

## 2021-10-07 RX ADMIN — FERROUS SULFATE TAB EC 324 MG (65 MG FE EQUIVALENT) 324 MG: 324 (65 FE) TABLET DELAYED RESPONSE at 08:25

## 2021-10-07 RX ADMIN — GLIPIZIDE 5 MG: 5 TABLET ORAL at 08:26

## 2021-10-07 RX ADMIN — PAROXETINE HYDROCHLORIDE 40 MG: 20 TABLET, FILM COATED ORAL at 08:25

## 2021-10-07 RX ADMIN — Medication 3 ML: at 08:25

## 2021-10-07 RX ADMIN — SODIUM BICARBONATE 650 MG TABLET 325 MG: at 08:25

## 2021-10-07 RX ADMIN — VENLAFAXINE 37.5 MG: 75 TABLET ORAL at 08:25

## 2021-10-07 RX ADMIN — CALCITRIOL 0.25 MCG: 0.25 CAPSULE ORAL at 08:25

## 2021-10-07 RX ADMIN — PIOGLITAZONE 30 MG: 30 TABLET ORAL at 08:25

## 2021-10-07 RX ADMIN — OXYCODONE 10 MG: 5 TABLET ORAL at 09:06

## 2021-10-07 RX ADMIN — DOCUSATE SODIUM 100 MG: 100 CAPSULE, LIQUID FILLED ORAL at 08:25

## 2021-10-07 RX ADMIN — POLYETHYLENE GLYCOL 3350 17 G: 17 POWDER, FOR SOLUTION ORAL at 08:25

## 2021-10-07 RX ADMIN — HYDRALAZINE HYDROCHLORIDE 25 MG: 25 TABLET, FILM COATED ORAL at 08:26

## 2021-10-07 RX ADMIN — HYDROCHLOROTHIAZIDE 25 MG: 25 TABLET ORAL at 08:26

## 2021-10-07 RX ADMIN — RIVAROXABAN 10 MG: 10 TABLET, FILM COATED ORAL at 08:26

## 2021-10-07 RX ADMIN — ATORVASTATIN CALCIUM 10 MG: 10 TABLET, FILM COATED ORAL at 08:26

## 2021-10-07 NOTE — PROGRESS NOTES
[facility] Medicine Services Daily Progress Note    Patient Name: Stefany Mancera  : 1960  MRN: [id number]  Primary Care Physician:  Rodolfo Vizcaino MD  Date of admission: 10/5/2021      Subjective      Chief Complaint: Status post total hip arthroplasty      Subjective   Denies for any new complaint, no nausea or vomiting.    Review of Systems   All other systems reviewed and are negative.         Objective      Vitals:   Temp:  [98.2 °F (36.8 °C)-99.1 °F (37.3 °C)] 98.6 °F (37 °C)  Heart Rate:  [79-94] 85  Resp:  [14-18] 14  BP: (118-166)/(74-83) 139/83    Physical Exam  Vitals and nursing note reviewed.   Constitutional:       General: She is not in acute distress.     Appearance: Normal appearance. She is well-developed. She is not ill-appearing, toxic-appearing or diaphoretic.   HENT:      Head: Normocephalic and atraumatic.      Right Ear: Ear canal and external ear normal.      Left Ear: Ear canal and external ear normal.      Nose: Nose normal. No congestion or rhinorrhea.      Mouth/Throat:      Mouth: Mucous membranes are moist.      Pharynx: No oropharyngeal exudate.   Eyes:      General: No scleral icterus.        Right eye: No discharge.         Left eye: No discharge.      Extraocular Movements: Extraocular movements intact.      Conjunctiva/sclera: Conjunctivae normal.      Pupils: Pupils are equal, round, and reactive to light.   Neck:      Thyroid: No thyromegaly.      Vascular: No carotid bruit or JVD.      Trachea: No tracheal deviation.   Cardiovascular:      Rate and Rhythm: Normal rate and regular rhythm.      Pulses: Normal pulses.      Heart sounds: Normal heart sounds. No murmur heard.   No friction rub. No gallop.    Pulmonary:      Effort: Pulmonary effort is normal. No respiratory distress.      Breath sounds: Normal breath sounds. No stridor. No wheezing, rhonchi or rales.   Chest:      Chest wall: No tenderness.   Abdominal:      General: Bowel  sounds are normal. There is no distension.      Palpations: Abdomen is soft. There is no mass.      Tenderness: There is no abdominal tenderness. There is no guarding or rebound.      Hernia: No hernia is present.   Musculoskeletal:         General: No swelling, tenderness, deformity or signs of injury. Normal range of motion.      Cervical back: Normal range of motion and neck supple. No rigidity. No muscular tenderness.      Right lower leg: No edema.      Left lower leg: No edema.   Lymphadenopathy:      Cervical: No cervical adenopathy.   Skin:     General: Skin is warm and dry.      Coloration: Skin is not jaundiced or pale.      Findings: No bruising, erythema or rash.   Neurological:      General: No focal deficit present.      Mental Status: She is alert and oriented to person, place, and time. Mental status is at baseline.      Cranial Nerves: No cranial nerve deficit.      Sensory: No sensory deficit.      Motor: No weakness or abnormal muscle tone.      Coordination: Coordination normal.   Psychiatric:         Mood and Affect: Mood normal.         Behavior: Behavior normal.         Thought Content: Thought content normal.         Judgment: Judgment normal.                 Result Review    Result Review:  I have personally reviewed the results from the time of this admission to 10/7/2021 13:40 EDT and agree with these findings:  []  Laboratory  []  Microbiology  []  Radiology  []  EKG/Telemetry   []  Cardiology/Vascular   []  Pathology  []  Old records  []  Other:  Most notable findings include:        Wounds (last 24 hours)      LDA Wound     Row Name 10/07/21 0712 10/06/21 1915          Wound 10/05/21 0759 Left anterior hip Incision    Wound - Properties Group Placement Date: 10/05/21  -MS Placement Time: 0759 -MS Side: Left  -MS Orientation: anterior  -MS Location: hip  -MS Primary Wound Type: Incision  -MS    Dressing Appearance  intact;dry;no drainage  -HO  dry;intact;no drainage  -CP     Closure   AUTUMN  -HO  AUTUMN  -CP     Base  dressing in place, unable to visualize picco dressing in place  -HO  dressing in place, unable to visualize  -CP     Drainage Amount  none  -HO  none  -CP     Dressing Care  -- PICCO drain in place  -HO  --     Retired Wound - Properties Group Date first assessed: 10/05/21  -MS Time first assessed: 0759  -MS Side: Left  -MS Location: hip  -MS Primary Wound Type: Incision  -MS       Wound 10/05/21 1814 midline gluteal    Wound - Properties Group Placement Date: 10/05/21  -AH Placement Time: 1814  -AH Present on Hospital Admission: Y  -AH Orientation: midline  -AH Location: gluteal  -AH    Dressing Appearance  open to air  -HO  open to air  -CP     Closure  Open to air  -HO  Open to air  -CP     Base  pink moist at times  -HO  moist;pink  -CP     Periwound  intact  -HO  --     Retired Wound - Properties Group Date first assessed: 10/05/21  - Time first assessed: 1814  -AH Present on Hospital Admission: Y  -AH Location: gluteal  -AH      User Key  (r) = Recorded By, (t) = Taken By, (c) = Cosigned By    Initials Name Provider Type    CP Deysi Gamboa RN Registered Nurse    Yaneth Pop LPN Licensed Nurse    Mali Lipscomb LPN Licensed Nurse    Noris Méndez RN Registered Nurse            Assessment/Plan      Brief Patient Summary:  Stefany Mancera is a 61 y.o. female who       atorvastatin, 10 mg, Oral, Daily  calcitriol, 0.25 mcg, Oral, Daily  docusate sodium, 100 mg, Oral, BID  ferrous sulfate, 324 mg, Oral, Daily With Breakfast  glipizide, 5 mg, Oral, BID AC  hydrALAZINE, 25 mg, Oral, TID  hydroCHLOROthiazide, 25 mg, Oral, Daily  PARoxetine, 40 mg, Oral, QAM  pioglitazone, 30 mg, Oral, Daily  polyethylene glycol, 17 g, Oral, Daily  rivaroxaban, 10 mg, Oral, Daily  sodium bicarbonate, 50 mEq, Intravenous, Once  sodium bicarbonate, 325 mg, Oral, TID  sodium chloride, 3 mL, Intravenous, Q12H  venlafaxine, 37.5 mg, Oral, QAM  verapamil SR, 240 mg, Oral, Nightly        sodium chloride, 125 mL/hr, Last Rate: 125 mL/hr (10/06/21 0625)         Active Hospital Problems:  Active Hospital Problems    Diagnosis    • Arthritis of left hip      Plan:     Status post left total hip arthroplasty, postop care as per primary, DVT prophylaxis as per plan.    Hypertension, vitals reviewed, stable, continue with, hydrochlorothiazide.    Dyslipidemia, continue statin, monitor LFTs as needed.    Chronic iron deficiency anemia, continue with iron supplement.    Diabetes mellitus type 2, Accu-Cheks stable, continue home regime, sliding scale as needed.    DVT prophylaxis as per primary    DVT prophylaxis:  Medical and mechanical DVT prophylaxis orders are present.    CODE STATUS:         Disposition:  I expect patient to be discharged as per primary service.    This patient has been examined wearing appropriate Personal Protective Equipment and discussed with hospital infection control department. 10/07/21      Electronically signed by Tulio Hargrove MD, 10/07/21, 13:40 EDT.  [facility] Hospitalist Team

## 2021-10-07 NOTE — PLAN OF CARE
Goal Outcome Evaluation:     Bed mobility - pt had transferred herself to eob with her  present.  Pt did not some assist to scoot out to eob.    Transfers - Min-A and with rolling walker  Sit to stand.  Pt attempted by herself   Ambulation - pt did not want to ambulate as she ready to d/c home.      Pt would benefit from Home with family assist and and Home Health at discharge from facility and requires no DME at discharge.   Pt desires Home with family assist and and Home Health at discharge. Pt cooperative; agreeable to therapeutic recommendations and plan of care.

## 2021-10-07 NOTE — DISCHARGE SUMMARY
Date of admission: 10/5/2021    Date of Discharge:  10/7/2021    Discharge Diagnosis: Left hip degenerative joint disease    Procedures Performed    Procedure(s):  TOTAL HIP ARTHROPLASTY, left  -------------------       Presenting Problem/History of Present Illness  Active Hospital Problems    Diagnosis  POA   • Arthritis of left hip [M16.12]  Yes      Resolved Hospital Problems   No resolved problems to display.          Hospital Course  Patient is a 61 y.o. female presented with left hip DJD.  Underwent a total hip placement.  Had difficulty walking the first day but under went physical therapy and felt she could handle herself at home postop day 2..        Consults:   Consults     Date and Time Order Name Status Description    10/5/2021  5:29 PM Inpatient Hospitalist Consult            Pertinent Test Results:     Condition on Discharge:  Stable            Discharge Disposition  Home health therapy    Discharge Medications     Discharge Medications      New Medications      Instructions Start Date   rivaroxaban 10 MG tablet  Commonly known as: XARELTO   10 mg, Oral, Daily         Continue These Medications      Instructions Start Date   calcitriol 0.25 MCG capsule  Commonly known as: ROCALTROL   0.25 mcg, Oral, Daily, HOLD DOS       doxycycline 100 MG capsule  Commonly known as: MONODOX   100 mg, Oral, 2 Times Daily      ferrous sulfate 324 (65 Fe) MG tablet delayed-release EC tablet   324 mg, Oral, Daily With Breakfast      gabapentin 300 MG capsule  Commonly known as: Neurontin   300 mg, Oral, Nightly PRN      glipizide 5 MG tablet  Commonly known as: GLUCOTROL   5 mg, Oral, 2 Times Daily Before Meals, HOLD DOS       hydrALAZINE 25 MG tablet  Commonly known as: APRESOLINE   25 mg, Oral, 3 Times Daily, Take dos      hydroCHLOROthiazide 25 MG tablet  Commonly known as: HYDRODIURIL   25 mg, Oral, Daily, HOLD DOS       magnesium oxide 400 (241.3 Mg) MG tablet tablet  Commonly known as: MAGOX   400 mg, Oral, Daily       oxyCODONE-acetaminophen 5-325 MG per tablet  Commonly known as: PERCOCET   1 or 2 tablets every 6 hours as needed      PARoxetine 40 MG tablet  Commonly known as: PAXIL   40 mg, Oral, Every Morning, Take dos      pioglitazone 30 MG tablet  Commonly known as: ACTOS   30 mg, Oral, Daily, HOLD DOS       pravastatin 20 MG tablet  Commonly known as: PRAVACHOL   20 mg, Oral, Daily      sodium bicarbonate 325 MG tablet   325 mg, Oral, 3 Times Daily      venlafaxine 37.5 MG tablet  Commonly known as: EFFEXOR   37.5 mg, Oral, Every Early Morning, Take dos      verapamil  MG CR tablet  Commonly known as: CALAN-SR   240 mg, Oral, Nightly      Vitamin D3 25 MCG (1000 UT) capsule   1,000 Units, Oral, Stop 9-28 for surgery             Discharge Diet:   Diet Instructions     Diabetic diet          Diabetic    Activity at Discharge: Weight-bear as tolerated total precautions.  May change dressing postop day 4 and shower then    Follow-up Appointments  No future appointments.  Additional Instructions for the Follow-ups that You Need to Schedule     Discharge Follow-up with Specialty: tere 10-12 days postop   As directed      Specialty: tere 10-12 days postop               Test Results Pending at Discharge       Ángel Madrigal MD  10/07/21  14:34 EDT

## 2021-10-07 NOTE — PLAN OF CARE
Goal Outcome Evaluation:        Bed mobility - Min assist of one for sit to supine  Transfers - Min-A and with rolling walker  Sit to stand from low recliner chair. Took 2 attempts but pt made good effort to come to standing.    Ambulation - 33 feet CGA and with rolling walker  Very slow pace, step-to gait pattern.  Improved tolerance for WBAT to LLE    WB'ing status: L Lower Extremity Weight Bearing As Tolerated    Pt would benefit from Inpatient Rehabilitation placement at discharge from facility and requires no DME at discharge.   Pt desires Home with family assist and and Home Health at discharge. Pt cooperative; agreeable to therapeutic recommendations and plan of care.

## 2021-10-07 NOTE — CASE MANAGEMENT/SOCIAL WORK
Case Management Discharge Note               Selected Continued Care - Discharged on 10/7/2021 Admission date: 10/5/2021 - Discharge [place] [facility] [place]     [place] Discharge Disposition Code: 01 - home or self-care

## 2021-10-07 NOTE — THERAPY TREATMENT NOTE
Subjective: Pt agreeable to therapeutic plan of care.  Pt reported she was feeling better today    Objective:     Bed mobility - Min assist of one for sit to supine  Transfers - Min-A and with rolling walker  Sit to stand from low recliner chair. Took 2 attempts but pt made good effort to come to standing.    Ambulation - 33 feet CGA and with rolling walker  Very slow pace, step-to gait pattern.  Improved tolerance for WBAT to LLE    WB'ing status: L Lower Extremity Weight Bearing As Tolerated    Precautions: Posterior hip precautions: NO hip flexion >90 degrees, NO hip adduction past midline, NO hip internal rotation  Pt had her right hip replaced 9 months ago and was familiar with all her precautions      Pain: 4 VAS left posterior hip.  Pt was premedicated for PT  Education: Provided education on importance of mobility and skilled verbal / tactile cueing throughout intervention.     Assessment: Stefany Mancera presents with functional mobility impairments which indicate the need for skilled intervention. Tolerating session today without incident.  Pt with much improved mobility today.  Pt does not want to go to rehab.  Pt wants to go home at d/c.  She stated she did fine with her other hip replacement 9 months ago.  Will continue to follow and progress as tolerated.     Plan/Recommendations:   Pt would benefit from Inpatient Rehabilitation placement at discharge from facility and requires no DME at discharge.   Pt desires Home with family assist and and Home Health at discharge. Pt cooperative; agreeable to therapeutic recommendations and plan of care.     Basic Mobility 6-click:  Rollin = Total, A lot = 2, A little = 3; 4 = None  Supine>Sit:   1 = Total, A lot = 2, A little = 3; 4 = None   Sit>Stand with arms:  1 = Total, A lot = 2, A little = 3; 4 = None  Bed>Chair:   1 = Total, A lot = 2, A little = 3; 4 = None  Ambulate in room:  1 = Total, A lot = 2, A little = 3; 4 = None  3-5 Steps with railing:   1 = Total, A lot = 2, A little = 3; 4 = None  Score: 16    Modified [place]: N/A = No pre-op stroke/TIA    Post-Tx Position: Supine with HOB Elevated, Alarms activated and Call light and personal items within reachPPE: gloves, surgical mask, eyewear protection

## 2021-10-07 NOTE — THERAPY TREATMENT NOTE
Subjective: Pt agreeable to therapeutic plan of care.    Objective:     Bed mobility - pt had transferred herself to eob with her  present.  Pt did not some assist to scoot out to eob.    Transfers - Min-A and with rolling walker  Sit to stand.  Pt attempted by herself   Ambulation - pt did not want to ambulate as she ready to d/c home.    Pain: 5 VAS left hip   Education: Provided education on importance of mobility and skilled verbal / tactile cueing throughout intervention.     Assessment: Stefany Mancera presents with functional mobility impairments which indicate the need for skilled intervention. Tolerating session today without incident.  Pt improving with each tx session.  Will continue to follow and progress as tolerated.     Plan/Recommendations:   Pt would benefit from Home with family assist and and Home Health at discharge from facility and requires no DME at discharge.   Pt desires Home with family assist and and Home Health at discharge. Pt cooperative; agreeable to therapeutic recommendations and plan of care.     Basic Mobility 6-click:  Rollin = Total, A lot = 2, A little = 3; 4 = None  Supine>Sit:   1 = Total, A lot = 2, A little = 3; 4 = None   Sit>Stand with arms:  1 = Total, A lot = 2, A little = 3; 4 = None  Bed>Chair:   1 = Total, A lot = 2, A little = 3; 4 = None  Ambulate in room:  1 = Total, A lot = 2, A little = 3; 4 = None  3-5 Steps with railin = Total, A lot = 2, A little = 3; 4 = None  Score: 16    Modified [place]: N/A = No pre-op stroke/TIA    Post-Tx Position: Call light and personal items within reach pt sitting at eob with her  present ready to take pt home  PPE: gloves, surgical mask, eyewear protection

## 2021-10-07 NOTE — PROGRESS NOTES
LOS: 0 days   Patient Care Team:  Rodolfo Vizcaino MD as PCP - General (Family Medicine)        Subjective     Difficulty ambulating yesterday.  Painful      Objective     Vital Signs  Vitals:    10/06/21 1624 10/06/21 2039 10/07/21 0231 10/07/21 0540   BP: 139/82 118/83 148/74 166/80   BP Location: Right arm Right arm Right arm Right arm   Patient Position: Lying Lying Lying Lying   Pulse: 79 94 81 82   Resp: 17 18 18 18   Temp: 98.8 °F (37.1 °C) 99.1 °F (37.3 °C) 98.6 °F (37 °C) 98.2 °F (36.8 °C)   TempSrc: Oral Oral Oral Oral   SpO2: 96% 96% 95% 97%   Weight:       Height:           Physical Exam:   Alert and oriented X3, unable to straight leg raise.  5/5 dorsiflexion plantarflexion     Results Review:     I reviewed the patient's new clinical results.  I reviewed the patient's new imaging results    Lab Results (last 24 hours)     Procedure Component Value Units Date/Time    POC Glucose Once [[id number]]  (Normal) Collected: 10/06/21 1628    Specimen: Blood Updated: 10/06/21 1629     Glucose 91 mg/dL      Comment: Serial Number: [id number]:  [id number]           Imaging Results (Last 24 Hours)     ** No results found for the last 24 hours. **            Medication Review:   Scheduled Meds:atorvastatin, 10 mg, Oral, Daily  calcitriol, 0.25 mcg, Oral, Daily  ferrous sulfate, 324 mg, Oral, Daily With Breakfast  glipizide, 5 mg, Oral, BID AC  hydrALAZINE, 25 mg, Oral, TID  hydroCHLOROthiazide, 25 mg, Oral, Daily  PARoxetine, 40 mg, Oral, QAM  pioglitazone, 30 mg, Oral, Daily  polyethylene glycol, 17 g, Oral, Daily  rivaroxaban, 10 mg, Oral, Daily  sodium bicarbonate, 50 mEq, Intravenous, Once  sodium bicarbonate, 325 mg, Oral, TID  sodium chloride, 3 mL, Intravenous, Q12H  venlafaxine, 37.5 mg, Oral, QAM  verapamil SR, 240 mg, Oral, Nightly      Continuous Infusions:sodium chloride, 125 mL/hr, Last Rate: 125 mL/hr (10/06/21 0625)      PRN Meds:.•  acetaminophen  •  flumazenil  •  gabapentin  •   melatonin  •  Morphine **AND** naloxone  •  Morphine **AND** naloxone  •  naloxone  •  ondansetron **OR** ondansetron  •  oxyCODONE  •  oxyCODONE  •  promethazine  •  sodium chloride      Assessment/Plan     Doing fair status post left total hip placement    Plan for disposition: Attempt therapy again today.  Possible discharge home    Ángel Madrigal MD  10/07/21  07:59 EDT

## 2021-10-07 NOTE — CASE MANAGEMENT/SOCIAL WORK
"Continued Stay Note   [facility]     Patient Name: Stefany Mancera  MRN: [id number]  Today's Date: 10/7/2021    Admit Date: 10/5/2021    Discharge Plan     Row Name 10/07/21 1211       Plan    Plan  DC Plan: Anticipate routine home, declined rehab or HH at this time.    Patient/Family in Agreement with Plan  yes    Plan Comments  CM met with patient at bedside to discuss dc planning. She reported she did not want to go to any rehab facility d/t her fears of \"catching COVID.\" She reported that her  and her daughter are both available to help her at home. She reported that she knows how to do therapy herself at home. Reported she has tried to get HH before but her insurance won't cover. She declined OP PT or any DME. Reported she has walkers, w/c, cane, BSC, shower chair, etc. CHRISTELLE updated [place] liaison and MD and RN via secure chat of dc plan.        Met with patient in room wearing PPE: mask/goggles.      Maintained distance greater than six feet and spent less than 15 minutes in the room.      Fela Amanda RN     Office Phone: [phone]  Office Cell: [phone]    "

## 2021-10-08 ENCOUNTER — READMISSION MANAGEMENT (OUTPATIENT)
Dept: CALL CENTER | Facility: HOSPITAL | Age: 61
End: 2021-10-08

## 2021-10-08 NOTE — OUTREACH NOTE
Prep Survey      Responses   [facility] discharged from?  [facility]   Is LACE score < 7 ?  No   Emergency Room discharge w/ pulse ox?  No   Eligibility  Readm Mgmt   Discharge diagnosis  TOTAL HIP ARTHROPLASTY   Does the patient have one of the following disease processes/diagnoses(primary or secondary)?  Total Joint Replacement   Does the patient have Home health ordered?  Yes   What is the Home health agency?   [facility]   Is there a DME ordered?  No   Prep survey completed?  Yes          HERLINDA Butcher RN

## 2021-10-11 ENCOUNTER — READMISSION MANAGEMENT (OUTPATIENT)
Dept: CALL CENTER | Facility: HOSPITAL | Age: 61
End: 2021-10-11

## 2021-10-20 ENCOUNTER — READMISSION MANAGEMENT (OUTPATIENT)
Dept: CALL CENTER | Facility: HOSPITAL | Age: 61
End: 2021-10-20

## 2021-10-20 NOTE — OUTREACH NOTE
Total Joint Week 2 Survey      Responses   [facility] patient discharged from? [facility]   Does the patient have one of the following disease processes/diagnoses(primary or secondary)? Total Joint Replacement   Joint surgery performed? Hip   Week 2 attempt successful? Yes   Call start time 0828   Call end time 0831   Has the patient been back in either the hospital or Emergency Department since discharge? No   Discharge diagnosis TOTAL HIP ARTHROPLASTY   Is the patient taking all medications as directed (includes completed medication regime)? Yes   Medication comments Last day of xarelto today   Has the patient kept scheduled appointments due by today? N/A   Comments F/U with surgeon November   Home health comments She is doing exercises on her own with help from    Psychosocial issues? No   Has the patient began therapy sessions (either in the home or as an out patient)? Yes   Has the patient fallen since discharge? No   What is the patient's perception of their functional status since discharge? Improving   Is the patient able to teach back how to prevent infection? Check incision daily,  Wash hands before and after touching incision   Is the patient able to teach back signs and symptoms of DVT? Area hot to touch   Is the patient/caregiver able to teach back the hierarchy of who to call/visit for symptoms/problems? PCP, Specialist, Home health nurse, Urgent Care, ED, 911 Yes   Additional teach back comments Reviewed s/s of infection with patient and pt states she is doing very well.    Week 2 call completed? Yes          Erika Hare RN

## 2021-11-03 ENCOUNTER — READMISSION MANAGEMENT (OUTPATIENT)
Dept: CALL CENTER | Facility: HOSPITAL | Age: 61
End: 2021-11-03

## 2021-11-03 NOTE — OUTREACH NOTE
Total Joint Month 1 Survey      Responses   [facility] patient discharged from? [facility]   Does the patient have one of the following disease processes/diagnoses(primary or secondary)? Total Joint Replacement   Joint surgery performed? Hip   Month 1 attempt successful? Yes   Call start time 1731   Call end time 1733   Has the patient been back in either the hospital or Emergency Department since discharge? No   Discharge diagnosis TOTAL HIP ARTHROPLASTY   Is the patient taking all medications as directed (includes completed medication regime)? Yes   Is the patient able to teach back alternate methods of pain control? Reposition,  Correct alignment,  Short, frequent activity   Has the patient kept scheduled appointments due by today? Yes   Is the patient still receiving Home Health Services? N/A   Is the patient still attending therapy sessions(either in the home or as an outpatient)? No  [self PT at home]   Has the patient fallen since discharge? No   What is the patient's perception of their functional status since discharge? Improving   Is the patient able to teach back signs and symptoms of infection? Temp >100.4 for 24h or longer,  Incisional drainage,  Changes in mobility,  Shortness of breath or chest pain,  Increased swelling or redness around incision (not associated with surgical edema)   Is the patient/caregiver able to teach back the hierarchy of who to call/visit for symptoms/problems? PCP, Specialist, Home health nurse, Urgent Care, ED, 911 Yes   Additional teach back comments states progressing well    Month 1 call completed? Yes          Blanca Osorio RN

## 2021-12-07 ENCOUNTER — READMISSION MANAGEMENT (OUTPATIENT)
Dept: CALL CENTER | Facility: HOSPITAL | Age: 61
End: 2021-12-07

## 2021-12-07 NOTE — OUTREACH NOTE
Total Joint Month 2 Survey      Responses   [facility] patient discharged from? [facility]   Does the patient have one of the following disease processes/diagnoses(primary or secondary)? Total Joint Replacement   Joint surgery performed? Hip   Month 2 attempt successful? Yes   Call start time 1008   Call end time 1010   Has the patient been back in either the hospital or Emergency Department since discharge? No   Discharge diagnosis TOTAL HIP ARTHROPLASTY   Is the patient taking all medications as directed (includes completed medication regime)? Yes   Is the patient able to teach back alternate methods of pain control? Reposition,  Correct alignment,  Short, frequent activity   Has the patient kept scheduled appointments due by today? Yes   Is the patient still receiving Home Health Services? N/A   Is the patient still attending therapy sessions(either in the home or as an outpatient)? No   Has the patient fallen since discharge? No   What is the patient's perception of their functional status since discharge? Improving   Is the patient able to teach back signs and symptoms of infection? Temp >100.4 for 24h or longer,  Incisional drainage,  Changes in mobility,  Shortness of breath or chest pain,  Increased swelling or redness around incision (not associated with surgical edema)   If the patient is a current smoker, are they able to teach back resources for cessation? Not a smoker   Is the patient/caregiver able to teach back the hierarchy of who to call/visit for symptoms/problems? PCP, Specialist, Home health nurse, Urgent Care, ED, 911 Yes   Month 2 Call Completed? Yes   Wrap up additional comments States she is doing great.           Jacob Mcclendon RN

## 2022-01-10 ENCOUNTER — READMISSION MANAGEMENT (OUTPATIENT)
Dept: CALL CENTER | Facility: HOSPITAL | Age: 62
End: 2022-01-10

## 2022-01-10 NOTE — OUTREACH NOTE
Total Joint Month 3 Survey      Responses   [facility] patient discharged from? [facility]   Does the patient have one of the following disease processes/diagnoses(primary or secondary)? Total Joint Replacement   Joint surgery performed? Hip   Month 3 attempt successful? Yes   Call start time 1637   Call end time 1638   Discharge diagnosis TOTAL HIP ARTHROPLASTY   Is the patient taking all medications as directed (includes completed medication regime)? Yes   Is the patient able to teach back alternate methods of pain control? Reposition,  Correct alignment,  Short, frequent activity   Has the patient kept scheduled appointments due by today? Yes   Is the patient still receiving Home Health Services? N/A   Is the patient still attending therapy sessions(either in the home or as an outpatient)? No   Has the patient fallen since discharge? No   What is the patient's perception of their functional status since discharge? Improving   Is the patient able to teach back signs and symptoms of infection? Changes in mobility   If the patient is a current smoker, are they able to teach back resources for cessation? Not a smoker   Is the patient/caregiver able to teach back the hierarchy of who to call/visit for symptoms/problems? PCP, Specialist, Home health nurse, Urgent Care, ED, 911 Yes   Graduated Yes   Is the patient interested in additional calls from an ambulatory ?  NOTE:  applies to high risk patients requiring additional follow-up. No   Did the patient feel the follow up calls were helpful during their recovery period? Yes   Was the number of calls appropriate? Yes   Wrap up additional comments States she is doing great. She is doing exercises on her own now.           Jacob Mcclendon RN

## 2022-10-05 ENCOUNTER — TRANSCRIBE ORDERS (OUTPATIENT)
Dept: ADMINISTRATIVE | Facility: HOSPITAL | Age: 62
End: 2022-10-05

## 2022-10-05 ENCOUNTER — LAB (OUTPATIENT)
Dept: LAB | Facility: HOSPITAL | Age: 62
End: 2022-10-05

## 2022-10-05 DIAGNOSIS — M25.559 PAIN, HIP: Primary | ICD-10-CM

## 2022-10-05 DIAGNOSIS — M25.559 PAIN, HIP: ICD-10-CM

## 2022-10-05 LAB
CRP SERPL-MCNC: 11.33 MG/DL (ref 0–0.5)
ERYTHROCYTE [SEDIMENTATION RATE] IN BLOOD: 62 MM/HR (ref 0–30)

## 2022-10-05 PROCEDURE — 85652 RBC SED RATE AUTOMATED: CPT

## 2022-10-05 PROCEDURE — 86140 C-REACTIVE PROTEIN: CPT

## 2022-10-05 PROCEDURE — 36415 COLL VENOUS BLD VENIPUNCTURE: CPT

## 2022-10-12 ENCOUNTER — HOSPITAL ENCOUNTER (OUTPATIENT)
Dept: INTERVENTIONAL RADIOLOGY/VASCULAR | Facility: HOSPITAL | Age: 62
Discharge: HOME | End: 2022-10-12
Admitting: ORTHOPAEDIC SURGERY

## 2022-10-12 VITALS — WEIGHT: 240 LBS | HEIGHT: 68 IN | BODY MASS INDEX: 36.37 KG/M2

## 2022-10-12 DIAGNOSIS — T84.52XA INFECTION ASSOCIATED WITH INTERNAL LEFT HIP PROSTHESIS, INITIAL ENCOUNTER: ICD-10-CM

## 2022-10-12 PROCEDURE — 87075 CULTR BACTERIA EXCEPT BLOOD: CPT | Performed by: ORTHOPAEDIC SURGERY

## 2022-10-12 PROCEDURE — 0 LIDOCAINE 1 % SOLUTION: Performed by: RADIOLOGY

## 2022-10-12 PROCEDURE — 87205 SMEAR GRAM STAIN: CPT | Performed by: ORTHOPAEDIC SURGERY

## 2022-10-12 PROCEDURE — 77002 NEEDLE LOCALIZATION BY XRAY: CPT

## 2022-10-12 PROCEDURE — 87070 CULTURE OTHR SPECIMN AEROBIC: CPT | Performed by: ORTHOPAEDIC SURGERY

## 2022-10-12 RX ORDER — LIDOCAINE HYDROCHLORIDE 10 MG/ML
INJECTION, SOLUTION INFILTRATION; PERINEURAL AS NEEDED
Status: COMPLETED | OUTPATIENT
Start: 2022-10-12 | End: 2022-10-12

## 2022-10-12 RX ADMIN — LIDOCAINE HYDROCHLORIDE 9 ML: 10 INJECTION, SOLUTION INFILTRATION; PERINEURAL at 14:07

## 2022-10-12 NOTE — NURSING NOTE
Pt assisted off stretcher and back to wheel chair.  Discharge instructions gone over with pt. Verbalizes understanding.

## 2022-10-12 NOTE — NURSING NOTE
"Pt into cath lab D.  Moved from stretcher to table.  Pt tearful \"I'm scared to death\".  Attempting to console pt.   " Patient Education        Panic Attacks: Care Instructions  Your Care Instructions    During a panic attack, you may have a feeling of intense fear or terror, trouble breathing, chest pain or tightness, heartbeat changes, dizziness, sweating, and shaking. A panic attack starts suddenly and usually lasts from 5 to 20 minutes but may last even longer. You have the most anxiety about 10 minutes after the attack starts. An attack can begin with a stressful event, or it can happen without a cause. Although panic attacks can cause scary symptoms, you can learn to manage them with self-care, counseling, and medicine. Follow-up care is a key part of your treatment and safety. Be sure to make and go to all appointments, and call your doctor if you are having problems. It's also a good idea to know your test results and keep a list of the medicines you take. How can you care for yourself at home? · Take your medicine exactly as directed. Call your doctor if you think you are having a problem with your medicine. · Go to your counseling sessions and follow-up appointments. · Recognize and accept your anxiety. Then, when you are in a situation that makes you anxious, say to yourself, \"This is not an emergency. I feel uncomfortable, but I am not in danger. I can keep going even if I feel anxious. \"  · Be kind to your body:  ? Relieve tension with exercise or a massage. ? Get enough rest.  ? Avoid alcohol, caffeine, nicotine, and illegal drugs. They can increase your anxiety level, cause sleep problems, or trigger a panic attack. ? Learn and do relaxation techniques. See below for more about these techniques. · Engage your mind. Get out and do something you enjoy. Go to a funny movie, or take a walk or hike. Plan your day. Having too much or too little to do can make you anxious. · Keep a record of your symptoms.  Discuss your fears with a good friend or family member, or join a support group for people with similar problems. Talking to others sometimes relieves stress. · Get involved in social groups, or volunteer to help others. Being alone sometimes makes things seem worse than they are. · Get at least 30 minutes of exercise on most days of the week to relieve stress. Walking is a good choice. You also may want to do other activities, such as running, swimming, cycling, or playing tennis or team sports. Relaxation techniques  Do relaxation exercises for 10 to 20 minutes a day. You can play soothing, relaxing music while you do them, if you wish. · Tell others in your house that you are going to do your relaxation exercises. Ask them not to disturb you. · Find a comfortable place, away from all distractions and noise. · Lie down on your back, or sit with your back straight. · Focus on your breathing. Make it slow and steady. · Breathe in through your nose. Breathe out through either your nose or mouth. · Breathe deeply, filling up the area between your navel and your rib cage. Breathe so that your belly goes up and down. · Do not hold your breath. · Breathe like this for 5 to 10 minutes. Notice the feeling of calmness throughout your whole body. As you continue to breathe slowly and deeply, relax by doing the following for another 5 to 10 minutes:  · Tighten and relax each muscle group in your body. You can begin at your toes and work your way up to your head. · Imagine your muscle groups relaxing and becoming heavy. · Empty your mind of all thoughts. · Let yourself relax more and more deeply. · Become aware of the state of calmness that surrounds you. · When your relaxation time is over, you can bring yourself back to alertness by moving your fingers and toes and then your hands and feet and then stretching and moving your entire body. Sometimes people fall asleep during relaxation, but they usually wake up shortly afterward.   · Always give yourself time to return to full alertness before you drive a car or do anything that might cause an accident if you are not fully alert. Never play a relaxation tape while driving a car. When should you call for help? Call 911 anytime you think you may need emergency care. For example, call if:    · You feel you cannot stop from hurting yourself or someone else.    Watch closely for changes in your health, and be sure to contact your doctor if:    · Your panic attacks get worse.     · You have new or different anxiety.     · You are not getting better as expected. Where can you learn more? Go to http://pinky-colt.info/. Enter H601 in the search box to learn more about \"Panic Attacks: Care Instructions. \"  Current as of: September 11, 2018  Content Version: 11.9  © 3884-1013 [employer]. Care instructions adapted under license by [employer] (which disclaims liability or warranty for this information). If you have questions about a medical condition or this instruction, always ask your healthcare professional. [place] [address] any warranty or liability for your use of this information.

## 2022-10-17 LAB
BACTERIA FLD CULT: NORMAL
BACTERIA SPEC ANAEROBE CULT: NORMAL
GRAM STN SPEC: NORMAL
GRAM STN SPEC: NORMAL

## (undated) DEVICE — SYR LUERLOK 30CC

## (undated) DEVICE — SOL IRR NACL 0.9PCT ARTHROMATIC 3000ML

## (undated) DEVICE — GAUZE,SPONGE,4"X4",32PLY,XRAY,STRL,LF: Brand: MEDLINE

## (undated) DEVICE — PAD,ABDOMINAL,5"X9",STERILE,LF,1/PK: Brand: MEDLINE INDUSTRIES, INC.

## (undated) DEVICE — APPL CHLORAPREP HI/LITE 26ML ORNG

## (undated) DEVICE — PICO 7 10CM X 30CM: Brand: PICO™ 7

## (undated) DEVICE — SOLUTION,WATER,IRRIGATION,1000ML,STERILE: Brand: MEDLINE

## (undated) DEVICE — SPNG GZ AVANT 6PLY 4X4IN STRL PK/2

## (undated) DEVICE — BNDG ELAS CO-FLEX SLF ADHR 4IN5YD LF STRL

## (undated) DEVICE — KT SURG TURNOVER 050

## (undated) DEVICE — SUT VIC COAT 2 CP 27IN

## (undated) DEVICE — BIT DRL 3.3MM 25MM

## (undated) DEVICE — 2108 SERIES SAGITTAL BLADE (24.8 X 1.24 X 73.8MM)

## (undated) DEVICE — SUT VIC 2/0 CT1 36IN

## (undated) DEVICE — UNDERGLV SURG BIOGEL INDICAT PI SZ8 BLU

## (undated) DEVICE — SUT ETHIB 2 CV V37 MS/4 30IN MX69G

## (undated) DEVICE — PENCL EVAC ULTRAVAC SMOKE W/BLD

## (undated) DEVICE — SUT VIC 1 CTX 36IN J977H

## (undated) DEVICE — 3M™ STERI-DRAPE™ U-DRAPE 1015: Brand: STERI-DRAPE™

## (undated) DEVICE — DRSNG GZ CURAD XEROFORM NONADHR OVERWRAP 5X9IN

## (undated) DEVICE — SOL IRRIG H2O 1000ML STRL

## (undated) DEVICE — GLV SURG SENSICARE PI LF PF 8 GRN STRL

## (undated) DEVICE — 3M™ IOBAN™ 2 ANTIMICROBIAL INCISE DRAPE 6650EZ: Brand: IOBAN™ 2

## (undated) DEVICE — LINER MDM CMTLS COCR 38MM
Type: IMPLANTABLE DEVICE | Site: HIP | Status: NON-FUNCTIONAL
Removed: 2021-02-04

## (undated) DEVICE — HIP PILLOW, ABDUCTION: Brand: DEROYAL

## (undated) DEVICE — PK TOTL HIP 50

## (undated) DEVICE — UNDRGLV SURG BIOGEL PIMICROINDICATOR SYNTH SZ8 LF STRL

## (undated) DEVICE — GLV SURG SENSICARE PI ORTHO SZ8 LF STRL

## (undated) DEVICE — BIT DRL 3.3X40MM